# Patient Record
Sex: FEMALE | Race: WHITE | NOT HISPANIC OR LATINO | Employment: UNEMPLOYED | URBAN - METROPOLITAN AREA
[De-identification: names, ages, dates, MRNs, and addresses within clinical notes are randomized per-mention and may not be internally consistent; named-entity substitution may affect disease eponyms.]

---

## 2018-10-01 ENCOUNTER — TRANSCRIBE ORDERS (OUTPATIENT)
Dept: ADMINISTRATIVE | Facility: HOSPITAL | Age: 43
End: 2018-10-01

## 2018-10-01 DIAGNOSIS — Z12.31 VISIT FOR SCREENING MAMMOGRAM: Primary | ICD-10-CM

## 2021-10-24 ENCOUNTER — APPOINTMENT (OUTPATIENT)
Dept: CT IMAGING | Facility: HOSPITAL | Age: 46
End: 2021-10-24

## 2021-10-24 ENCOUNTER — HOSPITAL ENCOUNTER (EMERGENCY)
Facility: HOSPITAL | Age: 46
Discharge: HOME | End: 2021-10-24
Attending: EMERGENCY MEDICINE | Admitting: EMERGENCY MEDICINE

## 2021-10-24 VITALS
HEART RATE: 70 BPM | HEIGHT: 64 IN | WEIGHT: 110 LBS | BODY MASS INDEX: 18.78 KG/M2 | TEMPERATURE: 97.8 F | SYSTOLIC BLOOD PRESSURE: 142 MMHG | OXYGEN SATURATION: 94 % | RESPIRATION RATE: 18 BRPM | DIASTOLIC BLOOD PRESSURE: 99 MMHG

## 2021-10-24 DIAGNOSIS — U07.1 PNEUMONIA DUE TO COVID-19 VIRUS: Primary | ICD-10-CM

## 2021-10-24 DIAGNOSIS — J12.82 PNEUMONIA DUE TO COVID-19 VIRUS: Primary | ICD-10-CM

## 2021-10-24 DIAGNOSIS — R53.1 WEAKNESS: ICD-10-CM

## 2021-10-24 DIAGNOSIS — R07.81 PLEURITIC CHEST PAIN: ICD-10-CM

## 2021-10-24 LAB
ALBUMIN SERPL-MCNC: 3.4 G/DL (ref 3.5–5.2)
ALBUMIN/GLOB SERPL: 1.3 G/DL
ALP SERPL-CCNC: 109 U/L (ref 39–117)
ALT SERPL W P-5'-P-CCNC: 126 U/L (ref 1–33)
ANION GAP SERPL CALCULATED.3IONS-SCNC: 10.7 MMOL/L (ref 5–15)
AST SERPL-CCNC: 109 U/L (ref 1–32)
BACTERIA UR QL AUTO: ABNORMAL /HPF
BASOPHILS # BLD AUTO: 0.01 10*3/MM3 (ref 0–0.2)
BASOPHILS NFR BLD AUTO: 0.2 % (ref 0–1.5)
BILIRUB SERPL-MCNC: 0.6 MG/DL (ref 0–1.2)
BILIRUB UR QL STRIP: NEGATIVE
BUN SERPL-MCNC: 13 MG/DL (ref 6–20)
BUN/CREAT SERPL: 22.4 (ref 7–25)
CALCIUM SPEC-SCNC: 8.6 MG/DL (ref 8.6–10.5)
CHLORIDE SERPL-SCNC: 102 MMOL/L (ref 98–107)
CLARITY UR: CLEAR
CO2 SERPL-SCNC: 26.3 MMOL/L (ref 22–29)
COLOR UR: YELLOW
CREAT SERPL-MCNC: 0.58 MG/DL (ref 0.57–1)
D DIMER PPP FEU-MCNC: 0.78 MCGFEU/ML (ref 0–0.46)
DEPRECATED RDW RBC AUTO: 41.1 FL (ref 37–54)
EOSINOPHIL # BLD AUTO: 0.01 10*3/MM3 (ref 0–0.4)
EOSINOPHIL NFR BLD AUTO: 0.2 % (ref 0.3–6.2)
ERYTHROCYTE [DISTWIDTH] IN BLOOD BY AUTOMATED COUNT: 12.4 % (ref 12.3–15.4)
GFR SERPL CREATININE-BSD FRML MDRD: 111 ML/MIN/1.73
GLOBULIN UR ELPH-MCNC: 2.7 GM/DL
GLUCOSE SERPL-MCNC: 89 MG/DL (ref 65–99)
GLUCOSE UR STRIP-MCNC: NEGATIVE MG/DL
HCT VFR BLD AUTO: 44.3 % (ref 34–46.6)
HGB BLD-MCNC: 14.2 G/DL (ref 12–15.9)
HGB UR QL STRIP.AUTO: ABNORMAL
HYALINE CASTS UR QL AUTO: ABNORMAL /LPF
IMM GRANULOCYTES # BLD AUTO: 0.19 10*3/MM3 (ref 0–0.05)
IMM GRANULOCYTES NFR BLD AUTO: 3.2 % (ref 0–0.5)
KETONES UR QL STRIP: ABNORMAL
LEUKOCYTE ESTERASE UR QL STRIP.AUTO: ABNORMAL
LYMPHOCYTES # BLD AUTO: 1.19 10*3/MM3 (ref 0.7–3.1)
LYMPHOCYTES NFR BLD AUTO: 19.9 % (ref 19.6–45.3)
MCH RBC QN AUTO: 28.5 PG (ref 26.6–33)
MCHC RBC AUTO-ENTMCNC: 32.1 G/DL (ref 31.5–35.7)
MCV RBC AUTO: 89 FL (ref 79–97)
MONOCYTES # BLD AUTO: 0.42 10*3/MM3 (ref 0.1–0.9)
MONOCYTES NFR BLD AUTO: 7 % (ref 5–12)
NEUTROPHILS NFR BLD AUTO: 4.15 10*3/MM3 (ref 1.7–7)
NEUTROPHILS NFR BLD AUTO: 69.5 % (ref 42.7–76)
NITRITE UR QL STRIP: NEGATIVE
PH UR STRIP.AUTO: 8 [PH] (ref 4.5–8)
PLATELET # BLD AUTO: 313 10*3/MM3 (ref 140–450)
PMV BLD AUTO: 10 FL (ref 6–12)
POTASSIUM SERPL-SCNC: 3.6 MMOL/L (ref 3.5–5.2)
PROCALCITONIN SERPL-MCNC: 0.07 NG/ML (ref 0–0.25)
PROT SERPL-MCNC: 6.1 G/DL (ref 6–8.5)
PROT UR QL STRIP: NEGATIVE
QT INTERVAL: 416 MS
RBC # BLD AUTO: 4.98 10*6/MM3 (ref 3.77–5.28)
RBC # UR: ABNORMAL /HPF
REF LAB TEST METHOD: ABNORMAL
SODIUM SERPL-SCNC: 139 MMOL/L (ref 136–145)
SP GR UR STRIP: 1.01 (ref 1–1.03)
SQUAMOUS #/AREA URNS HPF: ABNORMAL /HPF
TROPONIN T SERPL-MCNC: <0.01 NG/ML (ref 0–0.03)
UROBILINOGEN UR QL STRIP: ABNORMAL
WBC # BLD AUTO: 5.97 10*3/MM3 (ref 3.4–10.8)
WBC UR QL AUTO: ABNORMAL /HPF

## 2021-10-24 PROCEDURE — 81001 URINALYSIS AUTO W/SCOPE: CPT | Performed by: EMERGENCY MEDICINE

## 2021-10-24 PROCEDURE — 93010 ELECTROCARDIOGRAM REPORT: CPT | Performed by: INTERNAL MEDICINE

## 2021-10-24 PROCEDURE — 93005 ELECTROCARDIOGRAM TRACING: CPT | Performed by: EMERGENCY MEDICINE

## 2021-10-24 PROCEDURE — 84145 PROCALCITONIN (PCT): CPT | Performed by: EMERGENCY MEDICINE

## 2021-10-24 PROCEDURE — 0 IOPAMIDOL PER 1 ML: Performed by: EMERGENCY MEDICINE

## 2021-10-24 PROCEDURE — 99283 EMERGENCY DEPT VISIT LOW MDM: CPT | Performed by: EMERGENCY MEDICINE

## 2021-10-24 PROCEDURE — 99284 EMERGENCY DEPT VISIT MOD MDM: CPT

## 2021-10-24 PROCEDURE — 85379 FIBRIN DEGRADATION QUANT: CPT | Performed by: EMERGENCY MEDICINE

## 2021-10-24 PROCEDURE — 71275 CT ANGIOGRAPHY CHEST: CPT

## 2021-10-24 PROCEDURE — 84484 ASSAY OF TROPONIN QUANT: CPT | Performed by: EMERGENCY MEDICINE

## 2021-10-24 PROCEDURE — 85025 COMPLETE CBC W/AUTO DIFF WBC: CPT | Performed by: EMERGENCY MEDICINE

## 2021-10-24 PROCEDURE — 80053 COMPREHEN METABOLIC PANEL: CPT | Performed by: EMERGENCY MEDICINE

## 2021-10-24 RX ORDER — BENZONATATE 100 MG/1
100 CAPSULE ORAL 2 TIMES DAILY PRN
Qty: 15 CAPSULE | Refills: 0 | Status: SHIPPED | OUTPATIENT
Start: 2021-10-24 | End: 2021-10-31

## 2021-10-24 RX ORDER — SODIUM CHLORIDE 0.9 % (FLUSH) 0.9 %
10 SYRINGE (ML) INJECTION AS NEEDED
Status: DISCONTINUED | OUTPATIENT
Start: 2021-10-24 | End: 2021-10-24 | Stop reason: HOSPADM

## 2021-10-24 RX ORDER — LEVOTHYROXINE SODIUM 137 UG/1
137 TABLET ORAL DAILY
COMMUNITY

## 2021-10-24 RX ORDER — ALBUTEROL SULFATE 90 UG/1
2 AEROSOL, METERED RESPIRATORY (INHALATION) EVERY 6 HOURS PRN
Qty: 8 G | Refills: 0 | Status: SHIPPED | OUTPATIENT
Start: 2021-10-24

## 2021-10-24 RX ORDER — ONDANSETRON 4 MG/1
4 TABLET, FILM COATED ORAL EVERY 8 HOURS PRN
Qty: 20 TABLET | Refills: 0 | Status: SHIPPED | OUTPATIENT
Start: 2021-10-24 | End: 2021-10-31

## 2021-10-24 RX ORDER — BENZONATATE 100 MG/1
100 CAPSULE ORAL ONCE
Status: COMPLETED | OUTPATIENT
Start: 2021-10-24 | End: 2021-10-24

## 2021-10-24 RX ADMIN — IOPAMIDOL 100 ML: 755 INJECTION, SOLUTION INTRAVENOUS at 14:07

## 2021-10-24 RX ADMIN — BENZONATATE 100 MG: 100 CAPSULE ORAL at 11:28

## 2021-10-24 RX ADMIN — SODIUM CHLORIDE, POTASSIUM CHLORIDE, SODIUM LACTATE AND CALCIUM CHLORIDE 1000 ML: 600; 310; 30; 20 INJECTION, SOLUTION INTRAVENOUS at 11:29

## 2021-10-24 NOTE — ED NOTES
Patient arrives to ER with worsening symptoms of COVID. She was diagnosed positive Oct. 16, 2021. She relates she feels like she cannot get a deep breath and has a cough that will not go away. Fine crackles noted t/o lung field.     Teresita Duque, RN  10/24/21 9719

## 2021-10-24 NOTE — ED PROVIDER NOTES
Subjective   History of Present Illness  History of Present Illness    Chief complaint: Generalized weakness, coughing, rib pain    Location: generalized symptoms, left sided rib pains    Quality/Severity: Moderate weakness and pains    Timing/Duration: Ongoing for the past 9 days    Modifying Factors: None    Narrative: This patient presents for evaluation of persistent weakness with coughing and rib pains on her left side.  She was diagnosed with COVID-19 8 days ago.  She has been having symptoms for 9 days.  She is no longer having fevers.  She does have persistent coughing that is nonproductive.  With her coughing she is having a lot of pain in the left-sided ribs.  She has had a poor appetite but no vomiting or diarrhea symptoms.  She has generalized weakness and fatigue symptoms.  She says that her  insisted she should come to the hospital today to be evaluated.  She has been checking her oxygen occasionally at home and says that it occasionally does drop into the 70s and 80s range but on arrival here it is persistently above 95%.    Associated Symptoms: As above    Review of Systems   Constitutional: Positive for activity change, appetite change and fatigue. Negative for fever.   HENT: Positive for congestion.    Eyes: Negative for pain and visual disturbance.   Respiratory: Positive for cough and shortness of breath.    Cardiovascular: Positive for chest pain.   Gastrointestinal: Positive for nausea. Negative for diarrhea.   Genitourinary: Negative for dysuria and frequency.   Musculoskeletal: Positive for myalgias.   Skin: Negative for color change.   Neurological: Positive for weakness. Negative for syncope and headaches.   Psychiatric/Behavioral: Negative for confusion.   All other systems reviewed and are negative.      Past Medical History:   Diagnosis Date   • Disease of thyroid gland    • Hyperlipidemia        Allergies   Allergen Reactions   • Latex Anaphylaxis   • Erythromycin Hives   •  Penicillins Hives       History reviewed. No pertinent surgical history.    History reviewed. No pertinent family history.    Social History     Socioeconomic History   • Marital status:    Tobacco Use   • Smoking status: Never Smoker   Substance and Sexual Activity   • Alcohol use: Never   • Drug use: Never   • Sexual activity: Defer     ED Triage Vitals   Temp Heart Rate Resp BP SpO2   10/24/21 1100 10/24/21 1110 10/24/21 1100 10/24/21 1100 10/24/21 1100   97.9 °F (36.6 °C) 79 18 157/87 96 %      Temp src Heart Rate Source Patient Position BP Location FiO2 (%)   10/24/21 1110 10/24/21 1100 10/24/21 1100 10/24/21 1100 --   Oral Monitor Sitting Left arm      Objective   Physical Exam  Vitals and nursing note reviewed.   Constitutional:       General: She is not in acute distress.     Appearance: She is well-developed. She is not toxic-appearing or diaphoretic.   HENT:      Head: Normocephalic and atraumatic.      Nose: Nose normal.      Mouth/Throat:      Mouth: Mucous membranes are moist.   Eyes:      General:         Right eye: No discharge.         Left eye: No discharge.      Pupils: Pupils are equal, round, and reactive to light.   Cardiovascular:      Rate and Rhythm: Normal rate and regular rhythm.      Pulses: Normal pulses.      Heart sounds: Normal heart sounds. No murmur heard.  No friction rub.   Pulmonary:      Effort: Pulmonary effort is normal. No respiratory distress.      Breath sounds: No stridor. Rhonchi present.      Comments: Few scattered rhonchi noted bilaterally.  Normal work of breathing is noted.  Mild tenderness to the left lateral chest wall apparent.  No bruising or crepitus palpable.  Chest:      Chest wall: Tenderness present.   Abdominal:      Palpations: Abdomen is soft.      Tenderness: There is no abdominal tenderness. There is no guarding or rebound.   Musculoskeletal:         General: No deformity. Normal range of motion.      Cervical back: Normal range of motion and neck  supple.   Skin:     General: Skin is warm and dry.      Findings: No erythema or rash.   Neurological:      Mental Status: She is alert and oriented to person, place, and time.   Psychiatric:         Behavior: Behavior normal.         Thought Content: Thought content normal.         Judgment: Judgment normal.       EKG           EKG time/Interp time: 1117/1120  Rhythm/Rate: Sinus rhythm, 67 bpm  P waves and DE: P waves are present, 108 ms  QRS, axis: 94 ms, normal axis  ST and T waves: Nonspecific T wave flattening in the anterior lateral leads    Independently interpreted by me contemporaneously with treatment    Results for orders placed or performed during the hospital encounter of 10/24/21   Comprehensive Metabolic Panel    Specimen: Blood   Result Value Ref Range    Glucose 89 65 - 99 mg/dL    BUN 13 6 - 20 mg/dL    Creatinine 0.58 0.57 - 1.00 mg/dL    Sodium 139 136 - 145 mmol/L    Potassium 3.6 3.5 - 5.2 mmol/L    Chloride 102 98 - 107 mmol/L    CO2 26.3 22.0 - 29.0 mmol/L    Calcium 8.6 8.6 - 10.5 mg/dL    Total Protein 6.1 6.0 - 8.5 g/dL    Albumin 3.40 (L) 3.50 - 5.20 g/dL    ALT (SGPT) 126 (H) 1 - 33 U/L    AST (SGOT) 109 (H) 1 - 32 U/L    Alkaline Phosphatase 109 39 - 117 U/L    Total Bilirubin 0.6 0.0 - 1.2 mg/dL    eGFR Non African Amer 111 >60 mL/min/1.73    Globulin 2.7 gm/dL    A/G Ratio 1.3 g/dL    BUN/Creatinine Ratio 22.4 7.0 - 25.0    Anion Gap 10.7 5.0 - 15.0 mmol/L   Troponin    Specimen: Blood   Result Value Ref Range    Troponin T <0.010 0.000 - 0.030 ng/mL   D-dimer, Quantitative    Specimen: Blood   Result Value Ref Range    D-Dimer, Quantitative 0.78 (H) 0.00 - 0.46 MCGFEU/mL   Procalcitonin    Specimen: Blood   Result Value Ref Range    Procalcitonin 0.07 0.00 - 0.25 ng/mL   Urinalysis With Microscopic If Indicated (No Culture) - Urine, Clean Catch    Specimen: Urine, Clean Catch   Result Value Ref Range    Color, UA Yellow Yellow, Straw    Appearance, UA Clear Clear    pH, UA 8.0 4.5 -  8.0    Specific Gravity, UA 1.015 1.003 - 1.030    Glucose, UA Negative Negative    Ketones, UA 15 mg/dL (1+) (A) Negative    Bilirubin, UA Negative Negative    Blood, UA Trace (A) Negative    Protein, UA Negative Negative    Leuk Esterase, UA Trace (A) Negative    Nitrite, UA Negative Negative    Urobilinogen, UA 4.0 E.U./dL (A) 0.2 - 1.0 E.U./dL   CBC Auto Differential    Specimen: Blood   Result Value Ref Range    WBC 5.97 3.40 - 10.80 10*3/mm3    RBC 4.98 3.77 - 5.28 10*6/mm3    Hemoglobin 14.2 12.0 - 15.9 g/dL    Hematocrit 44.3 34.0 - 46.6 %    MCV 89.0 79.0 - 97.0 fL    MCH 28.5 26.6 - 33.0 pg    MCHC 32.1 31.5 - 35.7 g/dL    RDW 12.4 12.3 - 15.4 %    RDW-SD 41.1 37.0 - 54.0 fl    MPV 10.0 6.0 - 12.0 fL    Platelets 313 140 - 450 10*3/mm3    Neutrophil % 69.5 42.7 - 76.0 %    Lymphocyte % 19.9 19.6 - 45.3 %    Monocyte % 7.0 5.0 - 12.0 %    Eosinophil % 0.2 (L) 0.3 - 6.2 %    Basophil % 0.2 0.0 - 1.5 %    Immature Grans % 3.2 (H) 0.0 - 0.5 %    Neutrophils, Absolute 4.15 1.70 - 7.00 10*3/mm3    Lymphocytes, Absolute 1.19 0.70 - 3.10 10*3/mm3    Monocytes, Absolute 0.42 0.10 - 0.90 10*3/mm3    Eosinophils, Absolute 0.01 0.00 - 0.40 10*3/mm3    Basophils, Absolute 0.01 0.00 - 0.20 10*3/mm3    Immature Grans, Absolute 0.19 (H) 0.00 - 0.05 10*3/mm3   Urinalysis, Microscopic Only - Urine, Clean Catch    Specimen: Urine, Clean Catch   Result Value Ref Range    RBC, UA 0-2 (A) None Seen /HPF    WBC, UA 3-5 (A) None Seen /HPF    Bacteria, UA Trace (A) None Seen /HPF    Squamous Epithelial Cells, UA 7-12 (A) None Seen, 0-2 /HPF    Hyaline Casts, UA None Seen None Seen /LPF    Methodology Manual Light Microscopy    ECG 12 Lead   Result Value Ref Range    QT Interval 416 ms     RADIOLOGY        Study: CTA chest    Findings: 1. No evidence for pulmonary embolism.  2. Fairly extensive bilateral infiltrates consistent with Covid pneumonia asymmetrically worse involving the right lung.     Interpreted contemporaneously with  "treatment by Dr. Milton, independently viewed by me    Procedures           ED Course  ED Course as of 10/24/21 1612   Sun Oct 24, 2021   1610 I have reviewed the EKG and labs and CTA from today's visit.  All findings look to be acceptable to me.  There is no PE.  Patient does have bilateral Covid pneumonia but her work of breathing and her oxygenation are totally normal on room air here.  She is not vomiting.  Her electrolytes look to be balanced.  She is feeling better after some IV fluids and cough medicine.  I think she can continue symptomatic management at home for this viral illness.  I will prescribe her an albuterol inhaler as well as cough medicine and nausea medicine.  Fortunately, I think she is probably past the worst part of her illness now.  However I did review with her all of the usual \"return to ER\" instructions prior to discharge. [SHANICE]      ED Course User Index  [SHANICE] Nico Martínez MD                                           MDM  Number of Diagnoses or Management Options     Amount and/or Complexity of Data Reviewed  Clinical lab tests: reviewed and ordered  Tests in the radiology section of CPT®: reviewed and ordered  Tests in the medicine section of CPT®: reviewed  Decide to obtain previous medical records or to obtain history from someone other than the patient: yes  Review and summarize past medical records: yes  Independent visualization of images, tracings, or specimens: yes    Risk of Complications, Morbidity, and/or Mortality  Presenting problems: moderate  Diagnostic procedures: moderate  Management options: moderate        Final diagnoses:   Pneumonia due to COVID-19 virus   Weakness   Pleuritic chest pain       ED Disposition  ED Disposition     ED Disposition Condition Comment    Discharge Stable           PCP Office    Schedule an appointment as soon as possible for a visit in 1 week  Follow-up with local PCP for repeat evaluation         Medication List      New Prescriptions  "   albuterol sulfate  (90 Base) MCG/ACT inhaler  Commonly known as: PROVENTIL HFA;VENTOLIN HFA;PROAIR HFA  Inhale 2 puffs Every 6 (Six) Hours As Needed for Wheezing or Shortness of Air.     benzonatate 100 MG capsule  Commonly known as: TESSALON  Take 1 capsule by mouth 2 (Two) Times a Day As Needed for Cough for up to 7 days.     ondansetron 4 MG tablet  Commonly known as: ZOFRAN  Take 1 tablet by mouth Every 8 (Eight) Hours As Needed for Nausea or Vomiting for up to 7 days.           Where to Get Your Medications      You can get these medications from any pharmacy    Bring a paper prescription for each of these medications  · albuterol sulfate  (90 Base) MCG/ACT inhaler  · benzonatate 100 MG capsule  · ondansetron 4 MG tablet          Nico Martínez MD  10/24/21 [id number]

## 2021-10-24 NOTE — ED NOTES
Appropriate PPE worn when entering room: gown, gloves, N95, eye protection.     Teresita Duque RN  10/24/21 1204       Teresita Duque RN  10/24/21 1212

## 2023-04-10 ENCOUNTER — OFFICE VISIT (OUTPATIENT)
Dept: OBSTETRICS AND GYNECOLOGY | Facility: CLINIC | Age: 48
End: 2023-04-10
Payer: COMMERCIAL

## 2023-04-10 VITALS
WEIGHT: 131.4 LBS | SYSTOLIC BLOOD PRESSURE: 130 MMHG | DIASTOLIC BLOOD PRESSURE: 88 MMHG | HEIGHT: 64 IN | BODY MASS INDEX: 22.43 KG/M2

## 2023-04-10 DIAGNOSIS — Z87.898 HISTORY OF PITUITARY TUMOR: ICD-10-CM

## 2023-04-10 DIAGNOSIS — Z12.31 ENCOUNTER FOR SCREENING MAMMOGRAM FOR MALIGNANT NEOPLASM OF BREAST: ICD-10-CM

## 2023-04-10 DIAGNOSIS — Z01.419 ROUTINE GYNECOLOGICAL EXAMINATION: Primary | ICD-10-CM

## 2023-04-10 DIAGNOSIS — Z30.011 VISIT FOR ORAL CONTRACEPTIVE PRESCRIPTION: ICD-10-CM

## 2023-04-10 DIAGNOSIS — Z80.41 FAMILY HISTORY OF OVARIAN CANCER: ICD-10-CM

## 2023-04-10 DIAGNOSIS — R31.9 HEMATURIA, UNSPECIFIED TYPE: ICD-10-CM

## 2023-04-10 LAB
BILIRUB BLD-MCNC: NEGATIVE MG/DL
CLARITY, POC: CLEAR
COLOR UR: YELLOW
GLUCOSE UR STRIP-MCNC: NEGATIVE MG/DL
KETONES UR QL: NEGATIVE
LEUKOCYTE EST, POC: NEGATIVE
NITRITE UR-MCNC: NEGATIVE MG/ML
PH UR: 5 [PH] (ref 5–8)
PROT UR STRIP-MCNC: NEGATIVE MG/DL
RBC # UR STRIP: ABNORMAL /UL
SP GR UR: 1 (ref 1–1.03)
UROBILINOGEN UR QL: NORMAL

## 2023-04-10 RX ORDER — MELATONIN
1000 DAILY
COMMUNITY

## 2023-04-10 RX ORDER — NORETHINDRONE ACETATE AND ETHINYL ESTRADIOL, ETHINYL ESTRADIOL AND FERROUS FUMARATE 1MG-10(24)
KIT ORAL
COMMUNITY
Start: 2023-01-14 | End: 2023-04-10 | Stop reason: SDUPTHER

## 2023-04-10 RX ORDER — NORETHINDRONE ACETATE AND ETHINYL ESTRADIOL, ETHINYL ESTRADIOL AND FERROUS FUMARATE 1MG-10(24)
1 KIT ORAL DAILY
Qty: 84 TABLET | Refills: 3 | Status: SHIPPED | OUTPATIENT
Start: 2023-04-10

## 2023-04-10 RX ORDER — DIPHENOXYLATE HYDROCHLORIDE AND ATROPINE SULFATE 2.5; .025 MG/1; MG/1
TABLET ORAL
COMMUNITY

## 2023-04-10 RX ORDER — LEVOTHYROXINE SODIUM 75 MCG
TABLET ORAL
COMMUNITY
Start: 2023-02-10

## 2023-04-10 RX ORDER — DEXTROAMPHETAMINE SACCHARATE, AMPHETAMINE ASPARTATE, DEXTROAMPHETAMINE SULFATE AND AMPHETAMINE SULFATE 7.5; 7.5; 7.5; 7.5 MG/1; MG/1; MG/1; MG/1
TABLET ORAL
COMMUNITY
Start: 2023-03-31

## 2023-04-10 RX ORDER — MELOXICAM 15 MG/1
TABLET ORAL
COMMUNITY
Start: 2023-02-22

## 2023-04-10 RX ORDER — ATORVASTATIN CALCIUM 40 MG/1
TABLET, FILM COATED ORAL
COMMUNITY
Start: 2023-03-12

## 2023-04-10 RX ORDER — CALCIUM CARBONATE 200(500)MG
1 TABLET,CHEWABLE ORAL DAILY
COMMUNITY

## 2023-04-10 RX ORDER — LEVOTHYROXINE SODIUM 88 UG/1
TABLET ORAL
COMMUNITY
Start: 2010-01-01

## 2023-04-10 NOTE — PROGRESS NOTES
GYN Annual Exam     CC- Here for annual exam.     Kendra Mendoza is a 47 y.o. female new patient who presents for annual well woman exam. Periods are rare, lasting 1 days.  She is on LoLestrin continously. She has a family history of ovarian cancer and is not interested in BRCA screening today but will consider. She is not interested in Cologuard or C scope. She has a h/o a pituitary tumor but has not had her levels checked in > 15 years.  She is a former pt of Dr Tyson.     OB History        0    Para   0    Term   0       0    AB   0    Living   0       SAB   0    IAB   0    Ectopic   0    Molar   0    Multiple   0    Live Births   0                Menarche: 10  Current contraception: OCP (estrogen/progesterone)  History of abnormal Pap smear: no  History of abnormal mammogram: no  Family history of uterine, colon or ovarian cancer: yes - 2nd cousin- colon cancer, MGM ovarian cancer  Family history of breast cancer: no  H/o STDs: none  Last pap: - nl pap   Gardasil:missed  BIRGIT: none   H/O pituitary tumor.     Health Maintenance   Topic Date Due   • Annual Gynecologic Pelvic and Breast Exam  Never done   • COLORECTAL CANCER SCREENING  Never done   • COVID-19 Vaccine (1) Never done   • HEPATITIS C SCREENING  Never done   • ANNUAL PHYSICAL  Never done   • LIPID PANEL  Never done   • INFLUENZA VACCINE  2023   • PAP SMEAR  04/10/2026   • TDAP/TD VACCINES (2 - Td or Tdap) 2028   • Pneumococcal Vaccine 0-64  Aged Out       Past Medical History:   Diagnosis Date   • Disease of thyroid gland    • History of pituitary tumor    • Hyperlipidemia        History reviewed. No pertinent surgical history.      Current Outpatient Medications:   •  atorvastatin (LIPITOR) 40 MG tablet, , Disp: , Rfl:   •  Cholecalciferol 25 MCG (1000 UT) tablet, Take 1 tablet by mouth Daily., Disp: , Rfl:   •  levothyroxine (SYNTHROID, LEVOTHROID) 88 MCG tablet, , Disp: , Rfl:   •  Lo Loestrin Fe 1 MG-10 MCG / 10 MCG  tablet, Take 1 tablet by mouth Daily., Disp: 84 tablet, Rfl: 3  •  meloxicam (MOBIC) 15 MG tablet, , Disp: , Rfl:   •  multivitamin (THERAGRAN) tablet tablet, Take  by mouth., Disp: , Rfl:   •  Synthroid 75 MCG tablet, , Disp: , Rfl:   •  amphetamine-dextroamphetamine (ADDERALL) 30 MG tablet, , Disp: , Rfl:   •  calcium carbonate (TUMS) 500 MG chewable tablet, Chew 1 tablet Daily., Disp: , Rfl:   •  fluocinonide (LIDEX) 0.05 % cream, APPLY TO AFFECTED AREA TWICE A DAY UNTIL DIRECTED TO STOP, Disp: , Rfl:   •  NON FORMULARY, 1 tablet Daily., Disp: , Rfl:     Allergies   Allergen Reactions   • Latex Anaphylaxis and Swelling     Lips swelled at the dentist office   • Erythromycin Hives and Rash   • Penicillins Hives and Rash       Social History     Tobacco Use   • Smoking status: Never   Vaping Use   • Vaping Use: Never used   Substance Use Topics   • Alcohol use: Never   • Drug use: Never       Family History   Problem Relation Age of Onset   • Ovarian cancer Paternal Grandmother    • Colon cancer Other    • Breast cancer Neg Hx    • Uterine cancer Neg Hx    • Deep vein thrombosis Neg Hx    • Pulmonary embolism Neg Hx        Review of Systems   Constitutional: Negative for activity change, appetite change, fatigue, fever and unexpected weight change.   Eyes: Negative for photophobia and visual disturbance.   Respiratory: Negative for cough and shortness of breath.    Cardiovascular: Negative for chest pain and palpitations.   Gastrointestinal: Negative for abdominal distention, abdominal pain, constipation, diarrhea and nausea.   Endocrine: Negative for cold intolerance and heat intolerance.   Genitourinary: Negative for dyspareunia, dysuria, menstrual problem, pelvic pain, vaginal bleeding and vaginal discharge.   Musculoskeletal: Negative for back pain.   Skin: Negative for color change and rash.   Neurological: Negative for headaches.   Hematological: Negative for adenopathy. Does not bruise/bleed easily.  "  Psychiatric/Behavioral: Negative for dysphoric mood. The patient is not nervous/anxious.        /88   Ht 162.6 cm (64.02\")   Wt 59.6 kg (131 lb 6.4 oz)   LMP  (LMP Unknown)   BMI 22.54 kg/m²     Physical Exam  Vitals and nursing note reviewed. Exam conducted with a chaperone present.   Constitutional:       Appearance: Normal appearance. She is well-developed and normal weight.   HENT:      Head: Normocephalic and atraumatic.   Eyes:      General: No scleral icterus.     Conjunctiva/sclera: Conjunctivae normal.   Neck:      Thyroid: No thyromegaly.   Cardiovascular:      Rate and Rhythm: Normal rate and regular rhythm.   Pulmonary:      Effort: Pulmonary effort is normal.      Breath sounds: Normal breath sounds.   Chest:   Breasts:     Right: No swelling, bleeding, inverted nipple, mass, nipple discharge, skin change or tenderness.      Left: No swelling, bleeding, inverted nipple, mass, nipple discharge, skin change or tenderness.   Abdominal:      General: Bowel sounds are normal. There is no distension.      Palpations: Abdomen is soft. There is no mass.      Tenderness: There is no abdominal tenderness. There is no guarding or rebound.      Hernia: No hernia is present.   Genitourinary:     Exam position: Supine.      Labia:         Right: No rash, tenderness or lesion.         Left: No rash, tenderness or lesion.       Urethra: No prolapse, urethral pain, urethral swelling or urethral lesion.      Vagina: No signs of injury and foreign body. No vaginal discharge, erythema, tenderness or bleeding.      Cervix: No cervical motion tenderness, discharge or friability.      Uterus: Not deviated, not enlarged, not fixed and not tender.       Adnexa:         Right: No mass, tenderness or fullness.          Left: No mass, tenderness or fullness.     Musculoskeletal:      Cervical back: Neck supple.   Skin:     General: Skin is warm and dry.   Neurological:      Mental Status: She is alert and oriented to " person, place, and time.   Psychiatric:         Mood and Affect: Mood normal.         Behavior: Behavior normal.         Thought Content: Thought content normal.         Judgment: Judgment normal.            Assessment/Plan    1) GYN HM: pap/HPV  SBE demonstrated and encouraged.  2) STD screening: declines Condoms encouraged.  3) Contraception: OCP (estrogen/progesterone) Discussed with patient correct usage of oral contraceptive pills/patches/rings and what to do for a missed dose.  Patient reminded that condoms are the only form of contraceptive that can also prevent STDs and so use is encouraged with every act of coitus.  We reviewed ACHES warning signs (abdominal pain, chest pain, headache, eye vision changes or severe leg pain and or swelling).  Patient is encouraged to call for any questions or concerns.  Can continue OCPS until age 52.   4) Family Planning: family planning: never wants children, encourage folic acid daily  5) Diet and Exercise discussed  6) Smoking Status: No  7) Family history of ovarian cancer- pt declines BRCA testing, [facility] info given  8) MMG- UTD 6/2022 B1. [facility] 6/2023.   9)Hematuria- check UA and CS  10) H/O pituitary tumor- check prolactin  11) Declines Cscope and Cologuard.   12) I saw the patient with a face mask, gloves and eye protection  The patient herself was masked.  Social distancing was observed as appropriate.  13)Follow up prn or 1 year annual   14)  I spent > 30 minutes on the separately reported service of hematuria, h/o pituitary tumor, family history of ovarian cancer. This time is not included in the time used to support the annual E/M service also reported today.         Diagnoses and all orders for this visit:    1. Routine gynecological examination (Primary)  -     POC Urinalysis Dipstick  -     Prolactin  -     IGP, Apt HPV,rfx 16 / 18,45    2. Hematuria, unspecified type  -     Urine Culture - Urine, Urine, Random Void  -     Urinalysis With Microscopic  - Urine, Random Void    3. Encounter for screening mammogram for malignant neoplasm of breast  -     Mammo Screening Digital Tomosynthesis Bilateral With CAD; Future  -     IGP, Apt HPV,rfx 16 / 18,45    4. Visit for oral contraceptive prescription    5. Family history of ovarian cancer    6. History of pituitary tumor    Other orders  -     Lo Loestrin Fe 1 MG-10 MCG / 10 MCG tablet; Take 1 tablet by mouth Daily.  Dispense: 84 tablet; Refill: 3  -     Microscopic Examination -          Kimberly Harrington MD  04/10/2023    15:07 EDT

## 2023-04-11 ENCOUNTER — PATIENT ROUNDING (BHMG ONLY) (OUTPATIENT)
Dept: OBSTETRICS AND GYNECOLOGY | Facility: CLINIC | Age: 48
End: 2023-04-11
Payer: COMMERCIAL

## 2023-04-11 LAB — PROLACTIN SERPL-MCNC: 14.5 NG/ML (ref 4.8–23.3)

## 2023-04-11 NOTE — PROGRESS NOTES
A MY-CHART MESSAGE HAS BEEN SENT TO THE PATIENT FOR PATIENT ROUNDING WITH [facility]

## 2023-04-12 LAB
APPEARANCE UR: ABNORMAL
BACTERIA #/AREA URNS HPF: ABNORMAL /HPF
BACTERIA UR CULT: NORMAL
BACTERIA UR CULT: NORMAL
BILIRUB UR QL STRIP: NEGATIVE
CASTS URNS MICRO: ABNORMAL
COLOR UR: ABNORMAL
CRYSTALS URNS MICRO: ABNORMAL
EPI CELLS #/AREA URNS HPF: ABNORMAL /HPF
GLUCOSE UR QL STRIP: NEGATIVE
HGB UR QL STRIP: NEGATIVE
KETONES UR QL STRIP: ABNORMAL
LEUKOCYTE ESTERASE UR QL STRIP: ABNORMAL
NITRITE UR QL STRIP: NEGATIVE
PH UR STRIP: 6.5 [PH] (ref 5–8)
PROT UR QL STRIP: ABNORMAL
RBC #/AREA URNS HPF: ABNORMAL /HPF
SP GR UR STRIP: 1.03 (ref 1–1.03)
UROBILINOGEN UR STRIP-MCNC: ABNORMAL MG/DL
WBC #/AREA URNS HPF: ABNORMAL /HPF

## 2023-04-14 LAB
CYTOLOGIST CVX/VAG CYTO: ABNORMAL
CYTOLOGY CVX/VAG DOC CYTO: ABNORMAL
CYTOLOGY CVX/VAG DOC THIN PREP: ABNORMAL
DX ICD CODE: ABNORMAL
DX ICD CODE: ABNORMAL
HIV 1 & 2 AB SER-IMP: ABNORMAL
HPV I/H RISK 4 DNA CVX QL PROBE+SIG AMP: NEGATIVE
OTHER STN SPEC: ABNORMAL
PATHOLOGIST CVX/VAG CYTO: ABNORMAL
RECOM F/U CVX/VAG CYTO: ABNORMAL
STAT OF ADQ CVX/VAG CYTO-IMP: ABNORMAL

## 2023-04-16 RX ORDER — FLUCONAZOLE 150 MG/1
150 TABLET ORAL ONCE
Qty: 1 TABLET | Refills: 1 | Status: SHIPPED | OUTPATIENT
Start: 2023-04-16 | End: 2023-04-16

## 2023-04-17 NOTE — PROGRESS NOTES
PIP= pt has atypical cells on pap but neg HPV. Repeat pap/HPV in 1 year. She also has yeast on her pap smear and an ERX for Diflucan was called in.

## 2023-04-18 PROBLEM — Z30.011 VISIT FOR ORAL CONTRACEPTIVE PRESCRIPTION: Status: ACTIVE | Noted: 2023-04-18

## 2023-04-18 PROBLEM — R31.9 HEMATURIA: Status: ACTIVE | Noted: 2023-04-18

## 2023-04-18 PROBLEM — Z87.898 HISTORY OF PITUITARY TUMOR: Status: ACTIVE | Noted: 2023-04-18

## 2023-04-18 PROBLEM — Z80.41 FAMILY HISTORY OF OVARIAN CANCER: Status: ACTIVE | Noted: 2023-04-18

## 2023-05-01 ENCOUNTER — TELEPHONE (OUTPATIENT)
Dept: OBSTETRICS AND GYNECOLOGY | Facility: CLINIC | Age: 48
End: 2023-05-01
Payer: COMMERCIAL

## 2023-05-01 NOTE — TELEPHONE ENCOUNTER
Patient calling would like to know if she can get a refill on diflucan. The yeast infection has not gone away?

## 2023-05-02 RX ORDER — FLUCONAZOLE 150 MG/1
150 TABLET ORAL ONCE
Qty: 1 TABLET | Refills: 0 | Status: SHIPPED | OUTPATIENT
Start: 2023-05-02 | End: 2023-05-02

## 2023-05-08 ENCOUNTER — TELEPHONE (OUTPATIENT)
Dept: OBSTETRICS AND GYNECOLOGY | Facility: CLINIC | Age: 48
End: 2023-05-08
Payer: COMMERCIAL

## 2023-05-10 ENCOUNTER — TELEPHONE (OUTPATIENT)
Dept: OBSTETRICS AND GYNECOLOGY | Facility: CLINIC | Age: 48
End: 2023-05-10

## 2023-05-10 NOTE — TELEPHONE ENCOUNTER
PT REQUESTS TO SPEAK TO A NURSE REGARDING HER PRESCRIPTION THAT WAS SENT TO [facility]. PLEASE ADVISE PT.

## 2023-12-11 ENCOUNTER — HOSPITAL ENCOUNTER (EMERGENCY)
Facility: HOSPITAL | Age: 48
Discharge: HOME | End: 2023-12-11
Attending: EMERGENCY MEDICINE | Admitting: EMERGENCY MEDICINE
Payer: COMMERCIAL

## 2023-12-11 VITALS
WEIGHT: 130 LBS | RESPIRATION RATE: 18 BRPM | HEART RATE: 81 BPM | TEMPERATURE: 98.3 F | OXYGEN SATURATION: 100 % | DIASTOLIC BLOOD PRESSURE: 106 MMHG | SYSTOLIC BLOOD PRESSURE: 176 MMHG | BODY MASS INDEX: 20.89 KG/M2 | HEIGHT: 66 IN

## 2023-12-11 DIAGNOSIS — M79.674 TOE PAIN, RIGHT: Primary | ICD-10-CM

## 2023-12-11 PROCEDURE — 99282 EMERGENCY DEPT VISIT SF MDM: CPT

## 2023-12-11 NOTE — DISCHARGE INSTRUCTIONS
Continue your medications as directed.  Go directly to your podiatrist for your follow-up appointment this afternoon at 2:30.  Follow-up with your PCP as well.  Return to the ED for worsening symptoms or medical emergencies.

## 2023-12-11 NOTE — ED PROVIDER NOTES
EMERGENCY DEPARTMENT ENCOUNTER      Room Number: 14/14    History is provided by the patient, no translation services needed    HPI:    Chief complaint: Toe pain    Location: Right fifth toe    Quality/Severity: The patient describes the pain as a moderate tenderness    Timing/Duration: 1 week    Modifying Factors: None    Associated Symptoms: None    Narrative: Pt is a 48 y.o. female who presents complaining of right fifth toe pain x 1 week.  She describes the pain as a moderate tenderness.  She states that she also notices discoloration in the toe at times.  She states that this morning when she woke up the toe was quite red and the distal portion of the toe appeared purple.  She states that the discoloration has improved throughout the day.  She has scheduled an appointment with the podiatrist.  She states that she did a video call with a provider today and the provider instructed her to go to the ED.  She also complains of discoloration in her fingers when her hands are quite cold.  She denies that issue at this time.  She denies any other complaints.      PMD: Kendrick Perera MD    REVIEW OF SYSTEMS  Review of Systems   Constitutional:  Negative for fever.   Eyes:  Negative for visual disturbance.   Respiratory:  Negative for cough and shortness of breath.    Cardiovascular:  Negative for chest pain.   Gastrointestinal:  Negative for abdominal pain, nausea and vomiting.   Musculoskeletal:  Negative for back pain, gait problem and neck pain.   Skin:  Positive for color change (Right fifth toe). Negative for pallor, rash and wound.   Neurological:  Negative for dizziness, syncope, weakness and headaches.   Psychiatric/Behavioral:  Negative for confusion. The patient is not nervous/anxious.          PAST MEDICAL HISTORY  Active Ambulatory Problems     Diagnosis Date Noted    History of pituitary tumor 04/18/2023    Family history of ovarian cancer 04/18/2023    Visit for oral contraceptive prescription  04/18/2023    Hematuria 04/18/2023     Resolved Ambulatory Problems     Diagnosis Date Noted    No Resolved Ambulatory Problems     Past Medical History:   Diagnosis Date    Disease of thyroid gland     Hyperlipidemia        PAST SURGICAL HISTORY  Past Surgical History:   Procedure Laterality Date    HEMORRHOIDECTOMY         FAMILY HISTORY  Family History   Problem Relation Age of Onset    Ovarian cancer Paternal Grandmother     Colon cancer Other     Breast cancer Neg Hx     Uterine cancer Neg Hx     Deep vein thrombosis Neg Hx     Pulmonary embolism Neg Hx        SOCIAL HISTORY  Social History     Socioeconomic History    Marital status:    Tobacco Use    Smoking status: Never   Vaping Use    Vaping Use: Never used   Substance and Sexual Activity    Alcohol use: Never    Drug use: Never    Sexual activity: Yes     Partners: Male     Birth control/protection: Birth control pill       ALLERGIES  Latex, Erythromycin, and Penicillins    No current facility-administered medications for this encounter.    Current Outpatient Medications:     amphetamine-dextroamphetamine (ADDERALL) 30 MG tablet, , Disp: , Rfl:     atorvastatin (LIPITOR) 40 MG tablet, , Disp: , Rfl:     calcium carbonate (TUMS) 500 MG chewable tablet, Chew 1 tablet Daily., Disp: , Rfl:     Cholecalciferol 25 MCG (1000 UT) tablet, Take 1 tablet by mouth Daily., Disp: , Rfl:     fluocinonide (LIDEX) 0.05 % cream, APPLY TO AFFECTED AREA TWICE A DAY UNTIL DIRECTED TO STOP, Disp: , Rfl:     levothyroxine (SYNTHROID, LEVOTHROID) 88 MCG tablet, , Disp: , Rfl:     Lo Loestrin Fe 1 MG-10 MCG / 10 MCG tablet, Take 1 tablet by mouth Daily., Disp: 84 tablet, Rfl: 3    meloxicam (MOBIC) 15 MG tablet, , Disp: , Rfl:     multivitamin (THERAGRAN) tablet tablet, Take  by mouth., Disp: , Rfl:     NON FORMULARY, 1 tablet Daily., Disp: , Rfl:     Synthroid 75 MCG tablet, , Disp: , Rfl:     PHYSICAL EXAM  ED Triage Vitals [12/11/23 1305]   Temp Heart Rate Resp BP SpO2    98.3 °F (36.8 °C) 81 18 (!) 176/106 100 %      Temp src Heart Rate Source Patient Position BP Location FiO2 (%)   Oral Monitor Sitting Left arm --       Physical Exam  Vitals and nursing note reviewed.   Constitutional:       General: She is not in acute distress.     Appearance: Normal appearance. She is not ill-appearing, toxic-appearing or diaphoretic.   HENT:      Head: Normocephalic and atraumatic.      Nose: Nose normal. No congestion or rhinorrhea.      Mouth/Throat:      Mouth: Mucous membranes are moist.      Pharynx: Oropharynx is clear.   Eyes:      General: No scleral icterus.        Right eye: No discharge.         Left eye: No discharge.      Extraocular Movements: Extraocular movements intact.      Conjunctiva/sclera: Conjunctivae normal.      Pupils: Pupils are equal, round, and reactive to light.   Cardiovascular:      Rate and Rhythm: Normal rate and regular rhythm.      Pulses: Normal pulses.   Pulmonary:      Effort: Pulmonary effort is normal. No respiratory distress.   Abdominal:      General: There is no distension.      Palpations: Abdomen is soft.   Musculoskeletal:         General: Tenderness (Right fifth toe) present. No swelling, deformity or signs of injury. Normal range of motion.      Cervical back: Normal range of motion and neck supple. No rigidity.      Right lower leg: No edema.      Left lower leg: No edema.   Skin:     General: Skin is warm and dry.      Coloration: Skin is not jaundiced or pale.      Findings: Bruising (Right fifth toe) present. No erythema, lesion or rash.   Neurological:      Mental Status: She is alert and oriented to person, place, and time.      Motor: No weakness.      Coordination: Coordination normal.   Psychiatric:         Mood and Affect: Mood and affect normal.           LAB RESULTS  Lab Results (last 24 hours)       ** No results found for the last 24 hours. **              RADIOLOGY  No Radiology Exams Resulted Within Past 24  Hours        PROCEDURES  Procedures      PROGRESS AND CONSULTS  ED Course as of 12/11/23 1328   Mon Dec 11, 2023   1321 The patient appears well.  She is in no acute distress.  Her blood pressure is elevated.  All other vital signs are stable and within normal limits.  She is neurovascularly intact.  Pulses are 2+.  Capillary refill is within normal limits.  She advises that while she was waiting in the waiting room her podiatrist called and stated that they could see her this afternoon at 2:30 PM.  I advised her that I would like to order an x-ray to evaluate further.  She does not feel that an x-ray is necessary at this time and would prefer to do without.  Shortly after I exited the room, the patient contacted her podiatrist back and did confirm her appointment for 2:30 PM today.  She is requesting to be discharged to soon as possible in order to make the appointment with her podiatrist. [AH]      ED Course User Index  [AH] Yuridia Munoz PA-C           MEDICAL DECISION MAKING    MDM       My diagnosis for lower extremity pain and injury includes but is not limited to hip fracture, femur fracture, hip dislocation, hip contusion, hip sprain, hip strain, pelvic fracture, ischio-tibial band pain, ischio-tibial band bursitis, knee sprain, patella dislocation, knee dislocation, internal derangement of knee, fractures of the femur, tibia, fibula, ankle, foot and digits, ankle sprain, ankle dislocation, Lisfranc fracture, fracture dislocations of the digits, pulmonary embolism, claudication, peripheral vascular disease, gout, osteoarthritis, rheumatoid arthritis, bursitis, septic joint, poly-rheumatica, polyarthralgia and other inflammatory or infectious disease processes.   DIAGNOSIS  Final diagnoses:   Toe pain, right       Latest Documented Vital Signs:  As of 13:28 EST  BP- (!) 176/106 HR- 81 Temp- 98.3 °F (36.8 °C) (Oral) O2 sat- 100%    DISPOSITION  Pt discharged    Discussed pertinent findings with the  patient/family.  Patient/Family voiced understanding of need to follow-up for recheck and further testing as needed.  Return to the Emergency Department warnings were given.         Medication List      No changes were made to your prescriptions during this visit.              Follow-up Information       [facility] - [place]. Call today.    Why: to schedule follow up  Contact information:  [place] [address]  [phone]             Go to  [facility] EMERGENCY DEPARTMENT.    Specialty: Emergency Medicine  Why: If symptoms worsen  Contact information:  [address]  [place] [address] [phone]             Podiatry. Go today.    Why: for further evaluation  Contact information:  Leave the ED and go directly to your podiatry appointment this afternoon.                             Dictated utilizing [employer] dictation     Yuridia Munoz PA-C  12/11/23 8121

## 2024-03-04 RX ORDER — NORETHINDRONE ACETATE AND ETHINYL ESTRADIOL, ETHINYL ESTRADIOL AND FERROUS FUMARATE 1MG-10(24)
1 KIT ORAL DAILY
Qty: 84 TABLET | Refills: 0 | Status: SHIPPED | OUTPATIENT
Start: 2024-03-04

## 2024-03-27 ENCOUNTER — TELEPHONE (OUTPATIENT)
Dept: OBSTETRICS AND GYNECOLOGY | Facility: CLINIC | Age: 49
End: 2024-03-27

## 2024-03-27 NOTE — TELEPHONE ENCOUNTER
Caller: Kendra Mendoza    Relationship to patient: Self    Best call back number: [phone]  OKAY TO [facility]    Chief complaint: PATIENTS  HAD SURGERY AND HIS FOLLOW UP APPT IS THE SAME DAY AS HER ANNUAL 4/18/24. SHE WANTS TO KNOW IF SHE R/S HER ANNUAL FOR THE NEXT AVAIL APPT IN SEPT IF SHE CAN STILL HAVE A BIRTH CONTROL PRESCRIBED MONTHLY UNTIL SHE IS ABLE TO COME IN.    Type of visit: ANNUAL

## 2024-05-14 RX ORDER — NORETHINDRONE ACETATE AND ETHINYL ESTRADIOL, ETHINYL ESTRADIOL AND FERROUS FUMARATE 1MG-10(24)
1 KIT ORAL DAILY
Qty: 84 TABLET | Refills: 0 | Status: SHIPPED | OUTPATIENT
Start: 2024-05-14

## 2024-07-02 ENCOUNTER — TRANSCRIBE ORDERS (OUTPATIENT)
Dept: ADMINISTRATIVE | Facility: HOSPITAL | Age: 49
End: 2024-07-02
Payer: COMMERCIAL

## 2024-07-02 ENCOUNTER — LAB (OUTPATIENT)
Dept: LAB | Facility: HOSPITAL | Age: 49
End: 2024-07-02
Payer: COMMERCIAL

## 2024-07-02 DIAGNOSIS — E78.5 HYPERLIPIDEMIA, UNSPECIFIED HYPERLIPIDEMIA TYPE: ICD-10-CM

## 2024-07-02 DIAGNOSIS — E06.3 CHRONIC LYMPHOCYTIC THYROIDITIS: ICD-10-CM

## 2024-07-02 DIAGNOSIS — I10 ESSENTIAL HYPERTENSION, MALIGNANT: ICD-10-CM

## 2024-07-02 DIAGNOSIS — E06.3 CHRONIC LYMPHOCYTIC THYROIDITIS: Primary | ICD-10-CM

## 2024-07-02 DIAGNOSIS — E03.9 HYPOTHYROIDISM, UNSPECIFIED TYPE: ICD-10-CM

## 2024-07-02 LAB
25(OH)D3 SERPL-MCNC: 77.8 NG/ML (ref 30–100)
ALBUMIN SERPL-MCNC: 4.4 G/DL (ref 3.5–5.2)
ALBUMIN/GLOB SERPL: 1.8 G/DL
ALP SERPL-CCNC: 49 U/L (ref 39–117)
ALT SERPL W P-5'-P-CCNC: 22 U/L (ref 1–33)
ANION GAP SERPL CALCULATED.3IONS-SCNC: 8.8 MMOL/L (ref 5–15)
AST SERPL-CCNC: 25 U/L (ref 1–32)
BILIRUB SERPL-MCNC: 0.5 MG/DL (ref 0–1.2)
BUN SERPL-MCNC: 11 MG/DL (ref 6–20)
BUN/CREAT SERPL: 11.5 (ref 7–25)
CALCIUM SPEC-SCNC: 9.2 MG/DL (ref 8.6–10.5)
CHLORIDE SERPL-SCNC: 106 MMOL/L (ref 98–107)
CHOLEST SERPL-MCNC: 167 MG/DL (ref 0–200)
CO2 SERPL-SCNC: 25.2 MMOL/L (ref 22–29)
CORTIS SERPL-MCNC: 32.4 MCG/DL
CREAT SERPL-MCNC: 0.96 MG/DL (ref 0.57–1)
EGFRCR SERPLBLD CKD-EPI 2021: 72.2 ML/MIN/1.73
GLOBULIN UR ELPH-MCNC: 2.4 GM/DL
GLUCOSE SERPL-MCNC: 92 MG/DL (ref 65–99)
HDLC SERPL-MCNC: 66 MG/DL (ref 40–60)
LDLC SERPL CALC-MCNC: 90 MG/DL (ref 0–100)
LDLC/HDLC SERPL: 1.37 {RATIO}
POTASSIUM SERPL-SCNC: 4 MMOL/L (ref 3.5–5.2)
PROLACTIN SERPL-MCNC: 18.6 NG/ML (ref 4.79–23.3)
PROT SERPL-MCNC: 6.8 G/DL (ref 6–8.5)
PTH-INTACT SERPL-MCNC: 22.9 PG/ML (ref 15–65)
SODIUM SERPL-SCNC: 140 MMOL/L (ref 136–145)
T3FREE SERPL-MCNC: 3.01 PG/ML (ref 2–4.4)
T4 FREE SERPL-MCNC: 1.44 NG/DL (ref 0.92–1.68)
TRIGL SERPL-MCNC: 53 MG/DL (ref 0–150)
TSH SERPL DL<=0.05 MIU/L-ACNC: 3.27 UIU/ML (ref 0.27–4.2)
VLDLC SERPL-MCNC: 11 MG/DL (ref 5–40)

## 2024-07-02 PROCEDURE — 86258 DGP ANTIBODY EACH IG CLASS: CPT

## 2024-07-02 PROCEDURE — 84481 FREE ASSAY (FT-3): CPT

## 2024-07-02 PROCEDURE — 36415 COLL VENOUS BLD VENIPUNCTURE: CPT

## 2024-07-02 PROCEDURE — 82784 ASSAY IGA/IGD/IGG/IGM EACH: CPT

## 2024-07-02 PROCEDURE — 82533 TOTAL CORTISOL: CPT

## 2024-07-02 PROCEDURE — 84146 ASSAY OF PROLACTIN: CPT

## 2024-07-02 PROCEDURE — 86364 TISS TRNSGLTMNASE EA IG CLAS: CPT

## 2024-07-02 PROCEDURE — 86800 THYROGLOBULIN ANTIBODY: CPT

## 2024-07-02 PROCEDURE — 86376 MICROSOMAL ANTIBODY EACH: CPT

## 2024-07-02 PROCEDURE — 84443 ASSAY THYROID STIM HORMONE: CPT

## 2024-07-02 PROCEDURE — 84305 ASSAY OF SOMATOMEDIN: CPT

## 2024-07-02 PROCEDURE — 80061 LIPID PANEL: CPT

## 2024-07-02 PROCEDURE — 80053 COMPREHEN METABOLIC PANEL: CPT

## 2024-07-02 PROCEDURE — 82024 ASSAY OF ACTH: CPT

## 2024-07-02 PROCEDURE — 86231 EMA EACH IG CLASS: CPT

## 2024-07-02 PROCEDURE — 84439 ASSAY OF FREE THYROXINE: CPT

## 2024-07-02 PROCEDURE — 82306 VITAMIN D 25 HYDROXY: CPT

## 2024-07-02 PROCEDURE — 83970 ASSAY OF PARATHORMONE: CPT

## 2024-07-03 LAB
ACTH PLAS-MCNC: 8 PG/ML (ref 7.2–63.3)
ENDOMYSIUM IGA SER QL: NEGATIVE
GLIADIN PEPTIDE IGA SER-ACNC: 3 UNITS (ref 0–19)
GLIADIN PEPTIDE IGG SER-ACNC: 3 UNITS (ref 0–19)
IGA SERPL-MCNC: 103 MG/DL (ref 87–352)
THYROGLOB AB SERPL-ACNC: 13.1 IU/ML (ref 0–0.9)
THYROPEROXIDASE AB SERPL-ACNC: 159 IU/ML (ref 0–34)
TTG IGA SER-ACNC: <2 U/ML (ref 0–3)
TTG IGG SER-ACNC: <2 U/ML (ref 0–5)

## 2024-07-04 LAB — IGF-I SERPL-MCNC: 156 NG/ML (ref 70–225)

## 2024-08-27 RX ORDER — NORETHINDRONE ACETATE AND ETHINYL ESTRADIOL, ETHINYL ESTRADIOL AND FERROUS FUMARATE 1MG-10(24)
1 KIT ORAL DAILY
Qty: 84 TABLET | Refills: 0 | Status: SHIPPED | OUTPATIENT
Start: 2024-08-27

## 2024-10-24 ENCOUNTER — OFFICE VISIT (OUTPATIENT)
Dept: OBSTETRICS AND GYNECOLOGY | Facility: CLINIC | Age: 49
End: 2024-10-24
Payer: COMMERCIAL

## 2024-10-24 VITALS
BODY MASS INDEX: 20.86 KG/M2 | HEIGHT: 66 IN | WEIGHT: 129.8 LBS | SYSTOLIC BLOOD PRESSURE: 128 MMHG | DIASTOLIC BLOOD PRESSURE: 90 MMHG

## 2024-10-24 DIAGNOSIS — Z01.419 ROUTINE GYNECOLOGICAL EXAMINATION: ICD-10-CM

## 2024-10-24 DIAGNOSIS — Z01.419 CERVICAL SMEAR, AS PART OF ROUTINE GYNECOLOGICAL EXAMINATION: ICD-10-CM

## 2024-10-24 DIAGNOSIS — N95.1 MENOPAUSAL SYMPTOMS: Primary | ICD-10-CM

## 2024-10-24 DIAGNOSIS — Z11.51 SCREENING FOR HUMAN PAPILLOMAVIRUS: ICD-10-CM

## 2024-10-24 DIAGNOSIS — Z12.31 ENCOUNTER FOR SCREENING MAMMOGRAM FOR MALIGNANT NEOPLASM OF BREAST: ICD-10-CM

## 2024-10-24 DIAGNOSIS — Z79.890 HORMONE REPLACEMENT THERAPY (HRT): ICD-10-CM

## 2024-10-24 LAB
BILIRUB BLD-MCNC: NEGATIVE MG/DL
CLARITY, POC: CLEAR
COLOR UR: YELLOW
GLUCOSE UR STRIP-MCNC: NEGATIVE MG/DL
KETONES UR QL: NEGATIVE
LEUKOCYTE EST, POC: NEGATIVE
NITRITE UR-MCNC: NEGATIVE MG/ML
PH UR: 6 [PH] (ref 5–8)
PROT UR STRIP-MCNC: NEGATIVE MG/DL
RBC # UR STRIP: NEGATIVE /UL
SP GR UR: 1.02 (ref 1–1.03)
UROBILINOGEN UR QL: NORMAL

## 2024-10-24 RX ORDER — METHYLPREDNISOLONE 4 MG/1
TABLET ORAL
COMMUNITY
Start: 2024-09-17 | End: 2024-11-07

## 2024-10-24 RX ORDER — AZITHROMYCIN 250 MG/1
TABLET, FILM COATED ORAL
COMMUNITY
Start: 2024-09-17 | End: 2024-11-07

## 2024-10-24 RX ORDER — CHLORHEXIDINE GLUCONATE ORAL RINSE 1.2 MG/ML
SOLUTION DENTAL
COMMUNITY
Start: 2024-10-23

## 2024-10-24 RX ORDER — PROGESTERONE 100 MG/1
100 CAPSULE ORAL NIGHTLY
Qty: 30 CAPSULE | Refills: 3 | Status: SHIPPED | OUTPATIENT
Start: 2024-10-24

## 2024-10-24 RX ORDER — CEPHALEXIN 500 MG/1
CAPSULE ORAL
COMMUNITY
Start: 2024-09-17 | End: 2024-11-07

## 2024-10-24 RX ORDER — ESTRADIOL 0.04 MG/D
1 PATCH, EXTENDED RELEASE TRANSDERMAL 2 TIMES WEEKLY
Qty: 8 PATCH | Refills: 3 | Status: SHIPPED | OUTPATIENT
Start: 2024-10-24

## 2024-10-24 RX ORDER — TRIAZOLAM 0.25 MG
TABLET ORAL
COMMUNITY
Start: 2024-09-19

## 2024-10-24 RX ORDER — LOSARTAN POTASSIUM 25 MG/1
TABLET ORAL
COMMUNITY
Start: 2024-05-19 | End: 2024-11-07

## 2024-10-24 RX ORDER — DEXAMETHASONE 1 MG
TABLET ORAL
COMMUNITY
Start: 2024-08-07

## 2024-10-24 RX ORDER — AMLODIPINE BESYLATE 2.5 MG/1
TABLET ORAL
COMMUNITY
Start: 2024-09-23 | End: 2024-11-07

## 2024-10-24 NOTE — PROGRESS NOTES
GYN Annual Exam     CC- Here for annual exam.     Kendra Mendoza is a 49 y.o. female est pt who presents for annual well woman exam to discuss menopausal symptoms.  She had been on low Loestrin continuously and stopped it in August.  She has then had hot flashes and moodiness.  She is also tearful.  She has had a very hard year.  She lost her best friend in .  She does need to get her mammogram done.  She declines Cologuard or colonoscopy.  She has a family history of ovarian cancer but is not interested in BRCA screening.  We discussed checking labs for menopause as well as thyroid.  We also discussed options for menopausal symptoms such as lifestyle changes, OTC meds, SSRIs, HRT as well as Veozah.  She would like to try HRT and see if it helps improve her mood.    OB History          0    Para   0    Term   0       0    AB   0    Living   0         SAB   0    IAB   0    Ectopic   0    Molar   0    Multiple   0    Live Births   0                Menarche: 10  Current contraception: none  History of abnormal Pap smear: no  History of abnormal mammogram: no  Family history of uterine, colon or ovarian cancer: yes - 2nd cousin- colon cancer, MGM ovarian cancer , declines BRCA  Family history of breast cancer: no  H/o STDs: none  Last pap: 2023- ASCUS neg HPV pap smear  Gardasil:missed  BIRGIT: none   H/O pituitary tumor.     Health Maintenance   Topic Date Due    COLORECTAL CANCER SCREENING  Never done    HEPATITIS C SCREENING  Never done    ANNUAL PHYSICAL  Never done    ZOSTER VACCINE (1 of 2) Never done    Annual Gynecologic Pelvic and Breast Exam  2024    INFLUENZA VACCINE  Never done    COVID-19 Vaccine (2024- season) Never done    LIPID PANEL  2025    MAMMOGRAM  10/12/2025    PAP SMEAR  10/24/2027    TDAP/TD VACCINES (2 - Td or Tdap) 2028    Pneumococcal Vaccine 0-64  Aged Out       Past Medical History:   Diagnosis Date    Disease of thyroid gland     History of pituitary  tumor     Hyperlipidemia        Past Surgical History:   Procedure Laterality Date    HEMORRHOIDECTOMY           Current Outpatient Medications:     amLODIPine (NORVASC) 2.5 MG tablet, , Disp: , Rfl:     amphetamine-dextroamphetamine (ADDERALL) 30 MG tablet, , Disp: , Rfl:     atorvastatin (LIPITOR) 40 MG tablet, , Disp: , Rfl:     calcium carbonate (TUMS) 500 MG chewable tablet, Chew 1 tablet Daily., Disp: , Rfl:     chlorhexidine (PERIDEX) 0.12 % solution, , Disp: , Rfl:     Cholecalciferol 25 MCG (1000 UT) tablet, Take 1 tablet by mouth Daily., Disp: , Rfl:     levothyroxine (SYNTHROID, LEVOTHROID) 88 MCG tablet, , Disp: , Rfl:     meloxicam (MOBIC) 15 MG tablet, , Disp: , Rfl:     methylPREDNISolone (MEDROL) 4 MG tablet, , Disp: , Rfl:     multivitamin (THERAGRAN) tablet tablet, Take  by mouth., Disp: , Rfl:     azithromycin (ZITHROMAX) 250 MG tablet, , Disp: , Rfl:     cephalexin (KEFLEX) 500 MG capsule, , Disp: , Rfl:     dexAMETHasone (DECADRON) 1 MG tablet, , Disp: , Rfl:     estradiol (VIVELLE-DOT) 0.0375 MG/24HR patch, Place 1 patch on the skin as directed by provider 2 (Two) Times a Week., Disp: 8 patch, Rfl: 3    fluocinonide (LIDEX) 0.05 % cream, APPLY TO AFFECTED AREA TWICE A DAY UNTIL DIRECTED TO STOP, Disp: , Rfl:     losartan (COZAAR) 25 MG tablet, , Disp: , Rfl:     NON FORMULARY, 1 tablet Daily., Disp: , Rfl:     Progesterone (PROMETRIUM) 100 MG capsule, Take 1 capsule by mouth Every Night., Disp: 30 capsule, Rfl: 3    Synthroid 75 MCG tablet, , Disp: , Rfl:     triazolam (HALCION) 0.25 MG tablet, , Disp: , Rfl:     Allergies   Allergen Reactions    Latex Anaphylaxis and Swelling     Lips swelled at the dentist office    Erythromycin Hives and Rash    Penicillins Hives and Rash       Social History     Tobacco Use    Smoking status: Never   Vaping Use    Vaping status: Never Used   Substance Use Topics    Alcohol use: Never    Drug use: Never       Family History   Problem Relation Age of Onset     "Ovarian cancer Paternal Grandmother     Colon cancer Other     Breast cancer Neg Hx     Uterine cancer Neg Hx     Deep vein thrombosis Neg Hx     Pulmonary embolism Neg Hx        Review of Systems   Constitutional:  Positive for activity change. Negative for appetite change, fatigue, fever and unexpected weight change.   Eyes:  Negative for photophobia and visual disturbance.   Respiratory:  Negative for cough and shortness of breath.    Cardiovascular:  Negative for chest pain and palpitations.   Gastrointestinal:  Negative for abdominal distention, abdominal pain, constipation, diarrhea and nausea.   Endocrine: Positive for cold intolerance and heat intolerance.   Genitourinary:  Negative for dyspareunia, dysuria, menstrual problem, pelvic pain, vaginal bleeding and vaginal discharge.   Musculoskeletal:  Negative for back pain.   Skin:  Negative for color change and rash.   Neurological:  Negative for headaches.   Hematological:  Negative for adenopathy. Does not bruise/bleed easily.   Psychiatric/Behavioral:  Positive for decreased concentration, dysphoric mood and sleep disturbance. The patient is nervous/anxious (grief).        /90   Ht 167.6 cm (65.98\")   Wt 58.9 kg (129 lb 12.8 oz)   BMI 20.96 kg/m²     Physical Exam  Vitals and nursing note reviewed. Exam conducted with a chaperone present.   Constitutional:       Appearance: Normal appearance. She is well-developed and normal weight.   HENT:      Head: Normocephalic and atraumatic.   Eyes:      General: No scleral icterus.     Conjunctiva/sclera: Conjunctivae normal.   Neck:      Thyroid: No thyromegaly.   Cardiovascular:      Rate and Rhythm: Normal rate and regular rhythm.   Pulmonary:      Effort: Pulmonary effort is normal.      Breath sounds: Normal breath sounds.   Chest:   Breasts:     Right: No swelling, bleeding, inverted nipple, mass, nipple discharge, skin change or tenderness.      Left: No swelling, bleeding, inverted nipple, mass, " nipple discharge, skin change or tenderness.   Abdominal:      General: Bowel sounds are normal. There is no distension.      Palpations: Abdomen is soft. There is no mass.      Tenderness: There is no abdominal tenderness. There is no guarding or rebound.      Hernia: No hernia is present.   Genitourinary:     Exam position: Supine.      Labia:         Right: No rash, tenderness or lesion.         Left: No rash, tenderness or lesion.       Urethra: No prolapse, urethral pain, urethral swelling or urethral lesion.      Vagina: No signs of injury and foreign body. No vaginal discharge, erythema, tenderness or bleeding.      Cervix: No cervical motion tenderness, discharge or friability.      Uterus: Not deviated, not enlarged, not fixed and not tender.       Adnexa:         Right: No mass, tenderness or fullness.          Left: No mass, tenderness or fullness.     Musculoskeletal:      Cervical back: Neck supple.   Skin:     General: Skin is warm and dry.   Neurological:      Mental Status: She is alert and oriented to person, place, and time.   Psychiatric:         Mood and Affect: Affect is tearful.         Behavior: Behavior normal.         Thought Content: Thought content normal.         Judgment: Judgment normal.            Assessment/Plan    1) GYN HM: 4/2023- ASCUS neg HPV pap, check pap/HPV  SBE demonstrated and encouraged.  2) STD screening: declines Condoms encouraged.  3) Contraception: stopped OCPS in 8/2024, was taking continuously so cannot evaluate for menopause.  4) Family Planning: family planning: never wants children, encourage folic acid daily  5) Diet and Exercise discussed  6) Smoking Status: No  7) Family history of ovarian cancer- pt declines BRCA testing, [employer] info given  8) MMG- UTD 10/2023 JESUS Núñez MMG now, prefers Camron.   9) premenopausal-check FSH, E2, TSH and prolactin  10) H/O pituitary tumor- check prolactin  11) Declines Cscope and Cologuard.   12) Perimenopausal symptoms -  patient would like to try HRT.  Vivelle-Dot 0.0375 mg twice a week and Prometrium 100 mg nightly prescribed.  RTO 6 weeks to recheck symptoms. Patient counseled that hormone treatment should be used to help with specific symptoms related to menopause such as hot flashes, night sweats and vaginal atrophy.  Hormones should not be given for “general wellbeing” or to avoid aging. The lowest dose hormone that treats symptoms should be used for the shortest amount of time possible.  Although estrogen is the most effective treatment for hot flashes, other non hormonal options exist (such as Brisdelle or venlafaxine) and should also be considered.  Anyone with an intact uterus should also receive progestogen to prevent uterine overgrowth that can lead to uterine cancer.  All hormone therapy, whether it is synthetic or bio identical, can lead to increased risk of stroke, heart attack and thromboembolic diseases.  These adverse events are more likely to develop in the first year of use and in patients who are older than the typical menopausal age.  Risks of estrogen dependent cancers such as breast cancer increase with prolonged use.  Attempts to wean hormone therapy should be discussed annually and particularly after three to five years of use.  Any side effects such as vaginal bleeding or pain should be reported immediately.    13) Parts of this document have been copied or forwarded from her previous visits and have been reviewed, updated and edited as indicated.   14) RTO 6 weeks f/u HRT and 1 year annual           Diagnoses and all orders for this visit:    1. Menopausal symptoms (Primary)  -     Follicle Stimulating Hormone  -     TSH  -     Estradiol  -     Prolactin    2. Routine gynecological examination  -     POC Urinalysis Dipstick  -     IGP, Apt HPV,rfx 16 / 18,45    3. Cervical smear, as part of routine gynecological examination  -     IGP, Apt HPV,rfx 16 / 18,45    4. Screening for human papillomavirus  -      IGP, Apt HPV,rfx 16 / 18,45    5. Encounter for screening mammogram for malignant neoplasm of breast  -     Mammo Screening Digital Tomosynthesis Bilateral With CAD; Future    6. Hormone replacement therapy (HRT)    Other orders  -     estradiol (VIVELLE-DOT) 0.0375 MG/24HR patch; Place 1 patch on the skin as directed by provider 2 (Two) Times a Week.  Dispense: 8 patch; Refill: 3  -     Progesterone (PROMETRIUM) 100 MG capsule; Take 1 capsule by mouth Every Night.  Dispense: 30 capsule; Refill: 3          Kimberly Harrington MD  10/24/2024    08:38 EST

## 2024-10-25 LAB
ESTRADIOL SERPL-MCNC: <5 PG/ML
FSH SERPL-ACNC: 104 MIU/ML
PROLACTIN SERPL-MCNC: 17.6 NG/ML (ref 3.6–25.2)
TSH SERPL DL<=0.005 MIU/L-ACNC: 0.34 UIU/ML (ref 0.45–4.5)

## 2024-10-28 NOTE — PROGRESS NOTES
PIP= labs indicate menopause. Her TSH is abnormal. Please add a thyroid panel and TPO on to the blood in her lab and/or have her come in and have her blood drawn again.

## 2024-10-31 ENCOUNTER — LAB (OUTPATIENT)
Dept: OBSTETRICS AND GYNECOLOGY | Facility: CLINIC | Age: 49
End: 2024-10-31
Payer: COMMERCIAL

## 2024-10-31 DIAGNOSIS — N95.1 MENOPAUSAL SYMPTOMS: Primary | ICD-10-CM

## 2024-10-31 LAB
CYTOLOGIST CVX/VAG CYTO: ABNORMAL
CYTOLOGY CVX/VAG DOC CYTO: ABNORMAL
CYTOLOGY CVX/VAG DOC THIN PREP: ABNORMAL
DX ICD CODE: ABNORMAL
DX ICD CODE: ABNORMAL
HPV I/H RISK 4 DNA CVX QL PROBE+SIG AMP: NEGATIVE
Lab: ABNORMAL
OTHER STN SPEC: ABNORMAL
PATHOLOGIST CVX/VAG CYTO: ABNORMAL
RECOM F/U CVX/VAG CYTO: ABNORMAL
STAT OF ADQ CVX/VAG CYTO-IMP: ABNORMAL

## 2024-11-01 DIAGNOSIS — R76.8 ANTI-TPO ANTIBODIES PRESENT: Primary | ICD-10-CM

## 2024-11-01 LAB
T3 SERPL-MCNC: 116 NG/DL (ref 80–200)
T4 FREE SERPL-MCNC: 1.41 NG/DL (ref 0.92–1.68)
THYROPEROXIDASE AB SERPL-ACNC: 153 IU/ML (ref 0–34)

## 2024-11-01 NOTE — PROGRESS NOTES
PIP= normal thyroid functions but thyroid antibodies are present. Schedule thyroid US and pt needs to have an US thyroid. Please arrange

## 2024-11-04 ENCOUNTER — TELEPHONE (OUTPATIENT)
Dept: OBSTETRICS AND GYNECOLOGY | Facility: CLINIC | Age: 49
End: 2024-11-04
Payer: COMMERCIAL

## 2024-11-04 NOTE — TELEPHONE ENCOUNTER
Pt has been taking her HRT medication for 1 week .  Pt states she is having insomnia and depression feels worse. Pt is exhausted and not sleeping. Pt is wondering if she should go up in dose, or stop medication? She was crying and states she feels worse

## 2024-11-07 ENCOUNTER — OFFICE VISIT (OUTPATIENT)
Dept: OBSTETRICS AND GYNECOLOGY | Facility: CLINIC | Age: 49
End: 2024-11-07
Payer: COMMERCIAL

## 2024-11-07 VITALS
BODY MASS INDEX: 20.38 KG/M2 | HEIGHT: 66 IN | WEIGHT: 126.8 LBS | SYSTOLIC BLOOD PRESSURE: 116 MMHG | DIASTOLIC BLOOD PRESSURE: 68 MMHG

## 2024-11-07 DIAGNOSIS — F41.9 ANXIETY: Primary | ICD-10-CM

## 2024-11-07 DIAGNOSIS — Z71.89 GRIEF COUNSELING: ICD-10-CM

## 2024-11-07 DIAGNOSIS — G47.09 OTHER INSOMNIA: ICD-10-CM

## 2024-11-07 RX ORDER — BUPROPION HYDROCHLORIDE 150 MG/1
150 TABLET ORAL EVERY MORNING
Qty: 30 TABLET | Refills: 2 | Status: SHIPPED | OUTPATIENT
Start: 2024-11-07 | End: 2025-11-07

## 2024-11-07 RX ORDER — LEVOTHYROXINE SODIUM 88 UG/1
88 TABLET ORAL DAILY
COMMUNITY
Start: 2024-08-09

## 2024-11-07 RX ORDER — LORAZEPAM 1 MG/1
1 TABLET ORAL 2 TIMES DAILY PRN
Qty: 10 TABLET | Refills: 0 | Status: SHIPPED | OUTPATIENT
Start: 2024-11-07

## 2024-11-07 NOTE — PROGRESS NOTES
"      Kendra Mendoza is a 49 y.o. patient who presents for follow up of   Chief Complaint   Patient presents with    Follow-up       49-year-old established patient here for emergency appointment for anxiety and depression.  She was seen for an annual in last month and had stopped OCPs and did not have a cycle.  She was having hot flashes and night sweats and so was started on HRT.  She called this week complaining of worsening insomnia and worsening depression.  She is having significant grief regarding the fact that she would never have a biological child.  She thought that she had come to terms with this situation, however, menopause has brought all of these issues back to the forefront.  We had a long discussion about the unique grief of infertility.  She declines referral to counseling at this time.  I do think that she would benefit from being on an antidepressant.  She has previously been on Wellbutrin when her father  and would like to restart that medication.  She is also having significant insomnia and so we will do a short course of Ativan before bedtime.  I given her my cell phone number and advised her to call if she has any issues or concerns.  I would like to see her back in the office in 2 weeks for a recheck of symptoms.  She is to call for any worsening of condition      The following portions of the patient's history were reviewed and updated as appropriate: allergies, current medications and problem list.    Review of Systems   Constitutional:  Positive for activity change, appetite change and fatigue.   Psychiatric/Behavioral:  Positive for dysphoric mood and sleep disturbance. The patient is nervous/anxious.        /68   Ht 167.6 cm (65.98\")   Wt 57.5 kg (126 lb 12.8 oz)   LMP  (LMP Unknown) Comment: years ago last cycle  BMI 20.48 kg/m²     Physical Exam  Vitals and nursing note reviewed.   Constitutional:       Appearance: She is well-developed.   HENT:      Head: Normocephalic and " atraumatic.   Eyes:      General: No scleral icterus.     Conjunctiva/sclera: Conjunctivae normal.   Neck:      Thyroid: No thyromegaly.   Abdominal:      General: Bowel sounds are normal. There is no distension.      Palpations: Abdomen is soft. There is no mass.      Tenderness: There is no abdominal tenderness. There is no guarding or rebound.      Hernia: No hernia is present.   Skin:     General: Skin is warm and dry.   Neurological:      Mental Status: She is alert and oriented to person, place, and time.   Psychiatric:         Mood and Affect: Mood is anxious. Affect is tearful.         Behavior: Behavior normal.         Thought Content: Thought content normal.         Judgment: Judgment normal.         A/P:  1. Anxiety- ERX Wellbutrin  mg. QD   2. Grief- Time Spent: I spent > 30  minutes caring for Kendra on this date of service. This time includes time spent by me in the following activities: preparing for the visit, obtaining and/or reviewing a separately obtained history, performing a medically appropriate examination and/or evaluation, counseling and educating the patient/family/caregiver, ordering medications, tests, or procedures, and documenting information in the medical record.   3. Insomnia- Ativan 1 mg BID prn # 10.   4. HRT- continue same dose  5. RTO 2 weeks recheck symptoms.       Assessment & Plan   Diagnoses and all orders for this visit:    1. Anxiety (Primary)  -     LORazepam (Ativan) 1 MG tablet; Take 1 tablet by mouth 2 (Two) Times a Day As Needed for Anxiety or Sleep.  Dispense: 10 tablet; Refill: 0    2. Grief counseling    3. Other insomnia    Other orders  -     buPROPion XL (Wellbutrin XL) 150 MG 24 hr tablet; Take 1 tablet by mouth Every Morning.  Dispense: 30 tablet; Refill: 2                 No follow-ups on file.      Kimberly Harrington MD    11/7/2024  12:19 EST

## 2024-11-21 ENCOUNTER — OFFICE VISIT (OUTPATIENT)
Dept: OBSTETRICS AND GYNECOLOGY | Facility: CLINIC | Age: 49
End: 2024-11-21
Payer: COMMERCIAL

## 2024-11-21 VITALS
BODY MASS INDEX: 20.89 KG/M2 | WEIGHT: 130 LBS | HEIGHT: 66 IN | DIASTOLIC BLOOD PRESSURE: 78 MMHG | SYSTOLIC BLOOD PRESSURE: 122 MMHG

## 2024-11-21 DIAGNOSIS — T50.905A ADVERSE EFFECT OF DRUG, INITIAL ENCOUNTER: ICD-10-CM

## 2024-11-21 DIAGNOSIS — Z79.890 HORMONE REPLACEMENT THERAPY (HRT): Primary | ICD-10-CM

## 2024-11-21 DIAGNOSIS — F41.9 ANXIETY: ICD-10-CM

## 2024-11-21 PROBLEM — Z30.011 VISIT FOR ORAL CONTRACEPTIVE PRESCRIPTION: Status: RESOLVED | Noted: 2023-04-18 | Resolved: 2024-11-21

## 2024-11-21 RX ORDER — PROGESTERONE 100 MG/1
100 CAPSULE ORAL NIGHTLY
Qty: 90 CAPSULE | Refills: 1 | Status: SHIPPED | OUTPATIENT
Start: 2024-11-21

## 2024-11-21 RX ORDER — ESCITALOPRAM OXALATE 5 MG/1
5 TABLET ORAL DAILY
Qty: 30 TABLET | Refills: 2 | Status: SHIPPED | OUTPATIENT
Start: 2024-11-21

## 2024-11-21 RX ORDER — ESTRADIOL 0.04 MG/D
1 PATCH, EXTENDED RELEASE TRANSDERMAL 2 TIMES WEEKLY
Qty: 24 PATCH | Refills: 1 | Status: SHIPPED | OUTPATIENT
Start: 2024-11-21

## 2024-11-21 NOTE — PROGRESS NOTES
Kendra Mendoza is a 49 y.o. patient who presents for follow up of   Chief Complaint   Patient presents with    Follow-up     Meds     49-year-old established patient here for 2-week follow-up.  She stopped OCPs at 49 and did not have a cycle but was having hot flashes and night sweats and was started on HRT.  She is on Prometrium as well as Vivelle-Dot and feels that these are finally helping.  Her hot flashes and night sweats have improved.  She was also having worsening anxiety and depression.  She is having significant grief regarding the ending of her fertile window.  She had wanted to try Wellbutrin because she had taken it previously, however, this time she took it and it caused severe constipation she had to stop.  She is accompanied by her  who reports that she is either crying or about to cry at any given time.  She is not interested in counseling at this time but is agreeable to trying a new medication and we will start her on low-dose Lexapro.  We also discussed possible consult with [facility] to discuss fertility options.  She is not interested at this time.  She was given a prescription for Ativan for her insomnia.  She took the complete prescription and says she slept but had very vivid and somewhat disturbing dreams.  She seems to be a little bit more stable so we will hold off on giving her any Ativan for now and see if the Lexapro can help stabilize some of her sleep schedules.        The following portions of the patient's history were reviewed and updated as appropriate: allergies, current medications and problem list.    Review of Systems   Constitutional:  Positive for activity change and fatigue. Negative for appetite change.   Gastrointestinal:  Positive for constipation.   Endocrine: Negative for cold intolerance and heat intolerance.   Psychiatric/Behavioral:  Positive for dysphoric mood. Negative for sleep disturbance. The patient is nervous/anxious.        /78   Ht 167.6 cm  "(65.98\")   Wt 59 kg (130 lb)   LMP  (LMP Unknown) Comment: years ago last cycle  BMI 21.00 kg/m²     Physical Exam  Vitals and nursing note reviewed. Exam conducted with a chaperone present.   Constitutional:       Appearance: Normal appearance. She is well-developed.   HENT:      Head: Normocephalic and atraumatic.   Eyes:      General: No scleral icterus.     Conjunctiva/sclera: Conjunctivae normal.   Neck:      Thyroid: No thyromegaly.   Abdominal:      General: There is no distension.      Palpations: Abdomen is soft. There is no mass.      Tenderness: There is no abdominal tenderness. There is no guarding or rebound.      Hernia: No hernia is present.   Skin:     General: Skin is warm and dry.   Neurological:      Mental Status: She is alert and oriented to person, place, and time.   Psychiatric:         Mood and Affect: Affect is tearful.         Behavior: Behavior normal.         Thought Content: Thought content normal.         Judgment: Judgment normal.         A/P:  1. HF- doing better on HRT. Refills called in.   2. Anxiety and depression- did not tolerate Wellbutrin. Willing to try Lexapro 5 mg. RTO in 2 weeks to recheck symptoms. Will call for any worsening symptoms. Declines referral to counseling or psychiatry for now  3. Infertility- discussed egg and embryo adoption. [facility] info given. Declines referral for now.   4. RTO 2 weeks recheck symptoms  5. Time Spent: I spent > 30  minutes caring for Kendra on this date of service. This time includes time spent by me in the following activities: preparing for the visit, obtaining and/or reviewing a separately obtained history, performing a medically appropriate examination and/or evaluation, counseling and educating the patient/family/caregiver, ordering medications, tests, or procedures, and documenting information in the medical record.       Assessment & Plan   Diagnoses and all orders for this visit:    1. Hormone replacement therapy (HRT) " (Primary)    2. Anxiety    3. Adverse effect of drug, initial encounter    Other orders  -     escitalopram (Lexapro) 5 MG tablet; Take 1 tablet by mouth Daily.  Dispense: 30 tablet; Refill: 2  -     estradiol (VIVELLE-DOT) 0.0375 MG/24HR patch; Place 1 patch on the skin as directed by provider 2 (Two) Times a Week.  Dispense: 24 patch; Refill: 1  -     Progesterone (PROMETRIUM) 100 MG capsule; Take 1 capsule by mouth Every Night.  Dispense: 90 capsule; Refill: 1                 No follow-ups on file.      Kimberly Harrington MD    11/21/2024  12:53 EST

## 2024-12-05 ENCOUNTER — OFFICE VISIT (OUTPATIENT)
Dept: OBSTETRICS AND GYNECOLOGY | Facility: CLINIC | Age: 49
End: 2024-12-05
Payer: COMMERCIAL

## 2024-12-05 VITALS
SYSTOLIC BLOOD PRESSURE: 140 MMHG | WEIGHT: 132.2 LBS | DIASTOLIC BLOOD PRESSURE: 74 MMHG | HEIGHT: 66 IN | BODY MASS INDEX: 21.24 KG/M2

## 2024-12-05 DIAGNOSIS — Z51.81 MEDICATION MONITORING ENCOUNTER: ICD-10-CM

## 2024-12-05 DIAGNOSIS — Z13.89 SCREENING FOR GENITOURINARY CONDITION: Primary | ICD-10-CM

## 2024-12-05 DIAGNOSIS — Z79.890 HORMONE REPLACEMENT THERAPY (HRT): ICD-10-CM

## 2024-12-05 DIAGNOSIS — F32.A ANXIETY AND DEPRESSION: ICD-10-CM

## 2024-12-05 DIAGNOSIS — F41.9 ANXIETY AND DEPRESSION: ICD-10-CM

## 2024-12-05 RX ORDER — ESCITALOPRAM OXALATE 5 MG/1
5 TABLET ORAL DAILY
Qty: 90 TABLET | Refills: 2 | Status: SHIPPED | OUTPATIENT
Start: 2024-12-05

## 2024-12-05 NOTE — PROGRESS NOTES
"      Kendra Mendoza is a 49 y.o. patient who presents for follow up of   Chief Complaint   Patient presents with    Follow-up     49-year-old established patient here for follow-up of HRT.  She stopped OCPs at 49 and did not have a cycle but was having hot flashes and night sweats she was started on Prometrium as well as Vivelle-Dot.  She has also had significant anxiety and depression related to the end of her fertility..  We tried Wellbutrin but she had significant constipation and had to stop.  We then started her on Lexapro 5 mg.  She said she is at least 50% better.  She is not having daily crying jags.  She feels that she is on the right dose.  She also took herself off of Adderall because it was affecting her sleep.  Her HRT is still helping with hot flashes.  Thus far she is feels like she is doing better and does not want to change any medication at this time.        The following portions of the patient's history were reviewed and updated as appropriate: allergies, current medications and problem list.    Review of Systems   Constitutional:  Positive for activity change.   Endocrine: Negative for heat intolerance.   Genitourinary:  Negative for vaginal bleeding, vaginal discharge and vaginal pain.   Psychiatric/Behavioral:  Positive for dysphoric mood (Improved). Negative for sleep disturbance. The patient is nervous/anxious (Improved).        /74   Ht 167.6 cm (65.98\")   Wt 60 kg (132 lb 3.2 oz)   LMP  (LMP Unknown) Comment: years ago last cycle  BMI 21.35 kg/m²     Physical Exam  Vitals and nursing note reviewed.   Constitutional:       Appearance: Normal appearance. She is well-developed.   HENT:      Head: Normocephalic and atraumatic.   Eyes:      General: No scleral icterus.     Conjunctiva/sclera: Conjunctivae normal.   Neck:      Thyroid: No thyromegaly.   Skin:     General: Skin is warm and dry.   Neurological:      Mental Status: She is alert and oriented to person, place, and time. "   Psychiatric:         Mood and Affect: Mood normal.         Behavior: Behavior normal.         Thought Content: Thought content normal.         Judgment: Judgment normal.      Comments: Affect much brighter         A/P:  1. HRT- pt doing well on current dose. No changes  2. Anxiety and depression-patient improved on Lexapro 5 mg.  Wants to continue at this dose.  Refills called in.  3. RTO 2 months to recheck symptoms. Pt to call for any concerns.     Assessment & Plan   Diagnoses and all orders for this visit:    1. Screening for genitourinary condition (Primary)  -     Cancel: POC Urinalysis Dipstick    2. Hormone replacement therapy (HRT)    3. Anxiety and depression    4. Medication monitoring encounter    Other orders  -     escitalopram (Lexapro) 5 MG tablet; Take 1 tablet by mouth Daily.  Dispense: 90 tablet; Refill: 2                 No follow-ups on file.      Kimberly Harrington MD    12/5/2024  09:11 EST

## 2025-03-07 ENCOUNTER — TELEPHONE (OUTPATIENT)
Dept: ORTHOPEDIC SURGERY | Facility: CLINIC | Age: 50
End: 2025-03-07

## 2025-03-07 NOTE — TELEPHONE ENCOUNTER
Caller: Kendra Mendoza    Relationship to patient: Self    Best call back number: [phone]    Chief complaint: LEFT SHOULDER    Type of visit: NEW PATIENT    Requested date:  ASAP    If rescheduling, when is the original appointment:  03/17/25    Additional notes: PATIENT STATES SHOULDER PAIN IS GETTING WORSE, SHE IS CONCERNED SHE IS DOING MORE DAMAGE

## 2025-03-17 ENCOUNTER — OFFICE VISIT (OUTPATIENT)
Dept: ORTHOPEDIC SURGERY | Facility: CLINIC | Age: 50
End: 2025-03-17
Payer: COMMERCIAL

## 2025-03-17 VITALS
HEART RATE: 70 BPM | BODY MASS INDEX: 21.21 KG/M2 | DIASTOLIC BLOOD PRESSURE: 80 MMHG | HEIGHT: 66 IN | SYSTOLIC BLOOD PRESSURE: 126 MMHG | WEIGHT: 132 LBS

## 2025-03-17 DIAGNOSIS — M25.512 LEFT SHOULDER PAIN, UNSPECIFIED CHRONICITY: Primary | ICD-10-CM

## 2025-03-17 DIAGNOSIS — M75.02 ADHESIVE CAPSULITIS OF LEFT SHOULDER: ICD-10-CM

## 2025-03-17 PROCEDURE — 20610 DRAIN/INJ JOINT/BURSA W/O US: CPT | Performed by: ORTHOPAEDIC SURGERY

## 2025-03-17 PROCEDURE — 99203 OFFICE O/P NEW LOW 30 MIN: CPT | Performed by: ORTHOPAEDIC SURGERY

## 2025-03-17 RX ORDER — AMLODIPINE BESYLATE 5 MG/1
5 TABLET ORAL DAILY
COMMUNITY
Start: 2025-01-16

## 2025-03-17 RX ORDER — LIDOCAINE HYDROCHLORIDE 10 MG/ML
4 INJECTION, SOLUTION EPIDURAL; INFILTRATION; INTRACAUDAL; PERINEURAL
Status: COMPLETED | OUTPATIENT
Start: 2025-03-17 | End: 2025-03-17

## 2025-03-17 RX ORDER — TRIAMCINOLONE ACETONIDE 40 MG/ML
80 INJECTION, SUSPENSION INTRA-ARTICULAR; INTRAMUSCULAR
Status: COMPLETED | OUTPATIENT
Start: 2025-03-17 | End: 2025-03-17

## 2025-03-17 RX ADMIN — TRIAMCINOLONE ACETONIDE 80 MG: 40 INJECTION, SUSPENSION INTRA-ARTICULAR; INTRAMUSCULAR at 10:19

## 2025-03-17 RX ADMIN — LIDOCAINE HYDROCHLORIDE 4 ML: 10 INJECTION, SOLUTION EPIDURAL; INFILTRATION; INTRACAUDAL; PERINEURAL at 10:19
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
## 2025-03-18 ENCOUNTER — PATIENT ROUNDING (BHMG ONLY) (OUTPATIENT)
Dept: ORTHOPEDIC SURGERY | Facility: CLINIC | Age: 50
End: 2025-03-18
Payer: COMMERCIAL

## 2025-03-18 NOTE — PROGRESS NOTES
A My-Chart message has been sent to the patient for PATIENT ROUNDING with [facility].

## 2025-03-19 ENCOUNTER — HOSPITAL ENCOUNTER (OUTPATIENT)
Dept: PHYSICAL THERAPY | Facility: HOSPITAL | Age: 50
Setting detail: THERAPIES SERIES
Discharge: HOME | End: 2025-03-19
Payer: COMMERCIAL

## 2025-03-19 DIAGNOSIS — M75.02 ADHESIVE CAPSULITIS OF LEFT SHOULDER: ICD-10-CM

## 2025-03-19 DIAGNOSIS — M25.512 LEFT SHOULDER PAIN, UNSPECIFIED CHRONICITY: Primary | ICD-10-CM

## 2025-03-19 PROCEDURE — 97161 PT EVAL LOW COMPLEX 20 MIN: CPT | Performed by: PHYSICAL THERAPIST

## 2025-03-19 NOTE — THERAPY EVALUATION
"  Outpatient Physical Therapy Ortho Initial Evaluation   [facility]     Patient Name: Kendra Mendoza  : 1975  MRN: [id number]  Today's Date: 3/19/2025      Visit Date: 2025    Patient Active Problem List   Diagnosis    History of pituitary tumor    Family history of ovarian cancer    Hematuria        Past Medical History:   Diagnosis Date    Anxiety     Depression     Disease of thyroid gland     Female infertility     History of pituitary tumor     Hyperlipidemia     Hypertension 2020    Osteopenia 2010    Varicella 1978        Past Surgical History:   Procedure Laterality Date    HEMORRHOIDECTOMY      WISDOM TOOTH EXTRACTION         Visit Dx:     ICD-10-CM ICD-9-CM   1. Left shoulder pain, unspecified chronicity  M25.512 719.41   2. Adhesive capsulitis of left shoulder  M75.02 726.0          Patient History       Row Name 25 0700             History    Chief Complaint Pain;Difficulty with daily activities  -SP      Type of Pain Shoulder pain  -SP      Date Current Problem(s) Began --  2024  -SP      Brief Description of Current Complaint Patient reports she began having issues with left shoulder in 2024.   Initially thought she had slept on her shoulder \"wrong\" but symptoms continue to worsen.  She noticed pain and loss of motion.  She saw Dr. Bone and had an injection on Monday and it has really helped.  -SP      Previous treatment for THIS PROBLEM Injections;Medication  -SP      Patient's Rating of General Health Very good  -SP      Hand Dominance right-handed  -SP      Occupation/sports/leisure activities retired teacher  -SP         Pain     Pain Location Shoulder  -SP      Pain at Present 1  -SP      Pain at Worst 8  -SP      Pain Frequency Intermittent  -SP      Pain Description Aching  -SP      What Performance Factors Make the Current Problem(s) WORSE? reaching away from body, reaching behind self, take shirt off overhead  -SP      What Performance Factors Make the " Current Problem(s) BETTER? ice and heat  -SP      Tolerance Time- Standing unlimited  -SP      Tolerance Time- Sitting unlimited  -SP      Tolerance Time- Walking unlimited  -SP      Is your sleep disturbed? Yes  -SP      What position do you sleep in? Left sidelying;Supine  -SP      Difficulties with ADL's? fixing hair, reaching overhead and getting things out of cabinet, reaching behind self  -SP         Fall Risk Assessment    Any falls in the past year: No  -SP         Daily Activities    Primary Language English  -SP      Are you able to read Yes  -SP      Are you able to write Yes  -SP      How does patient learn best? Listening;Reading;Demonstration;Pictures/Video  -SP      Teaching needs identified Home Exercise Program;Management of Condition  -SP      Patient is concerned about/has problems with Reaching over head;Performing home management (household chores, shopping, care of dependents)  -SP      Does patient have problems with the following? None  -SP      Barriers to learning None  -SP      Pt Participated in POC and Goals Yes  -SP         Safety    Are you being hurt, hit, or frightened by anyone at home or in your life? No  -SP      Are you being neglected by a caregiver No  -SP                User Key  (r) = Recorded By, (t) = Taken By, (c) = Cosigned By      Initials Name Provider Type    Litzy Mendez, PT Physical Therapist                     PT Ortho       Row Name 03/19/25 0700       Posture/Observations    Posture/Observations Comments no edema, atrophy or abnormaties observed  -SP       Shoulder Girdle Accessory Motions    Posterior glide of humerus Left:;Hypomobile  -SP    Inferior glide of humerus Left:;Hypomobile  -SP    Long axis distraction Left:;Hypomobile  -SP       Shoulder Impingement/Rotator Cuff Special Tests    Ram-Leroy Test (RC Lesion vs. Bursitis) Left:;Positive  -SP    Neer Impingement Test (RC Lesion vs. Bursitis) Left:;Positive  -SP    Empty Can Test (RC  Lesion) Left:;Negative  pain with testing  -SP       Shoulder Girdle Palpation    Subacromial Space Left:;Tender  -SP       Left Upper Ext    Lt Shoulder Abduction AROM 108 degrees with pain  -SP    Lt Shoulder Abduction PROM 150 degrees with pain  -SP    Lt Shoulder Flexion AROM 120 degrees with pain  -SP    Lt Shoulder Flexion PROM 135 degrees with pain  -SP    Lt Shoulder External Rotation AROM able to reach to back of head with pain  -SP    Lt Shoulder External Rotation PROM 50 degrees with pain  -SP    Lt Shoulder Internal Rotation AROM able to reach to lateral left iliac crest with pain  -SP    Lt Shoulder Internal Rotation PROM 38 degrees with pain  -SP    Lt Elbow Extension/Flexion AROM WFL  -SP       MMT Left Upper Ext    Lt Shoulder Flexion MMT, Gross Movement (4-/5) good minus  -SP    Lt Shoulder Extension MMT, Gross Movement (4/5) good  -SP    Lt Shoulder ABduction MMT, Gross Movement (3+/5) fair plus  -SP    Lt Shoulder Internal Rotation MMT, Gross Movement (4-/5) good minus  -SP    Lt Shoulder External Rotation MMT, Gross Movement (3+/5) fair plus  -SP    Lt Scapular ADduction MMT, Gross Movement (3-/5) fair minus  -SP    Lt Elbow Flexion MMT, Gross Movement (4/5) good  -SP    Lt Elbow Extension MMT, Gross Movement (4/5) good  -SP       Sensation    Sensation WNL? WFL  -SP       Upper Extremity Flexibility    Upper Trapezius Left:;Mildly limited  -SP    Pect Minor Left:;Moderately limited  -SP    Pect Major Left:;Moderately limited  -SP    Subscapularis Left:;Moderately limited  -SP       Transfers    Bed-Chair [place] (Transfers) independent  -SP    Chair-Bed [place] (Transfers) independent  -SP    Sit-Stand [place] (Transfers) independent  -SP    Stand-Sit [place] (Transfers) independent  -SP       Gait/Stairs (Locomotion)    [place] Level (Gait) independent  -SP              User Key  (r) = Recorded By, (t) = Taken By, (c) = Cosigned By      Initials Name Provider Type    SP  Litzy Quinn, PT Physical Therapist                                Therapy Education  Given: HEP  Program: New  How Provided: Verbal, Written  Provided to: Patient  Level of Understanding: Verbalized, Demonstrated      PT OP Goals       Row Name 03/19/25 0800          PT Short Term Goals    STG Date to Achieve 04/03/25  -SP     STG 1 Patient demonstrates AROM left shoulder flexion to >135 degrees  -SP     STG 2 Patient reports she is better able to fix her hair without increased pain or compensation  -SP     STG 3 Patient able to sleep through night without waking due to left shoulder pain  -SP     STG 4 Patient demonstrates left shoulder PROM to WFL without complaints of pain at end ranges  -SP        Long Term Goals    LTG Date to Achieve 04/18/25  -SP     LTG 1 Patient demonstrates left shoulder AROM to WFL with normal GH scapula rhythm  -SP     LTG 2 Patient demonstrates left scapula stabilizer and shoulder strength to >4/5 throughout  -SP     LTG 3 Patient able to perform home and community ADLs with 0-1/10 pain or limitation  -SP     LTG 4 Patient indpendent with HEP  -SP        Time Calculation    PT Goal Re-Cert Due Date 04/18/25  -SP               User Key  (r) = Recorded By, (t) = Taken By, (c) = Cosigned By      Initials Name Provider Type    SP Litzy Quinn, PT Physical Therapist                     PT Assessment/Plan       Row Name 03/19/25 0811          PT Assessment    Functional Limitations Limitation in home management;Limitations in community activities;Performance in self-care ADL;Performance in leisure activities;Limitations in functional capacity and performance  -SP     Assessment Comments Patient with insidious onset of left shoulder pain and progressing ROM limitation since Dec 2024.  Patient diagnosed with adhesive capsulitis.  Patient presents with pain, decrease shoulder ROM, weakness and difficulty performing self care, home and community ADLs.  -SP     Please  refer to paper survey for additional self-reported information Yes  -SP     Rehab Potential Good  -SP     Patient/caregiver participated in establishment of treatment plan and goals Yes  -SP     Patient would benefit from skilled therapy intervention Yes  -SP        PT Plan    PT Frequency 1x/week;2x/week  -SP     Predicted Duration of Therapy Intervention (PT) 4-6 weeks  -SP     Planned CPT's? PT EVAL LOW COMPLEXITY: 19836;PT THER PROC EA 15 MIN: 76773;PT MANUAL THERAPY EA 15 MIN: 36996;PT HOT OR COLD PACK TREAT MCARE;PT ELECTRICAL STIM UNATTEND:   -SP               User Key  (r) = Recorded By, (t) = Taken By, (c) = Cosigned By      Initials Name Provider Type    Litzy Mendez, PT Physical Therapist                     Modalities       Row Name 03/19/25 0700             Moist Heat    MH Applied Yes  -SP      Location left shoulder: patient supine  -SP      PT Moist Heat Minutes 10  -SP      MH Prior to Rx Yes  -SP         Ice    Ice Applied Yes  -SP      Location left shoulder  -SP      PT Ice Rx Minutes 10  -SP      Ice S/P Rx Yes  -SP                User Key  (r) = Recorded By, (t) = Taken By, (c) = Cosigned By      Initials Name Provider Type    Litzy Mendez, PT Physical Therapist                   OP Exercises       Row Name 03/19/25 0800             Exercise 1    Exercise Name 1 AAROM shoulder fleixon: clasped hands, supine  -SP      Reps 1 10  -SP         Exercise 2    Exercise Name 2 AAROM shoulder flexion: cane  -SP      Reps 2 10  -SP         Exercise 3    Exercise Name 3 prone on elbows posterior capsule stretch  -SP      Time 3 3 min  -SP         Exercise 4    Exercise Name 4 Wall walk  -SP      Reps 4 5  -SP                User Key  (r) = Recorded By, (t) = Taken By, (c) = Cosigned By      Initials Name Provider Type    Litzy Mendez, PT Physical Therapist                  Manual Rx (Last 36 Hours)       Manual Treatments       Row Name 03/19/25 0700              Manual Rx 1    Manual Rx 1 Location left shoulder  -SP      Manual Rx 1 Type PROM into all planes with distraction oscillation:  10 x each  -SP      Manual Rx 1 Grade Gr I  -SP      Manual Rx 1 Duration 8 min  -SP         Manual Rx 2    Manual Rx 2 Location left shoulder  -SP      Manual Rx 2 Type GH joint mobilization: posterior glide and inferior glide 15 osc x 5 each  -SP                User Key  (r) = Recorded By, (t) = Taken By, (c) = Cosigned By      Initials Name Provider Type    Litzy Mendez, PT Physical Therapist                                Outcome Measure Options: Quick DASH  Quick DASH  Open a tight or new jar.: Moderate Difficulty  Do heavy household chores (e.g., wash walls, wash floors): Moderate Difficulty  Carry a shopping bag or briefcase: Mild Difficulty  Wash your back: Moderate Difficulty  Use a knife to cut food: No Difficulty  Recreational activities in which you take some force or impact through your arm, should or hand (e.g. golf, hammering, tennis, etc.): Severe Difficulty  During the past week, to what extent has your arm, shoulder, or hand problem interfered with your normal social activites with family, friends, neighbors or groups?: Slightly  During the past week, were you limited in your work or other regular daily activities as a result of your arm, shoulder or hand problem?: Moderately Limited  Arm, Shoulder, or hand pain: Severe  Tingling (pins and needles) in your arm, shoulder, or hand: None  During the past week, how much difficulty have you had sleeping because of the pain in your arm, shoulder or hand?: Moderate Difficiculty  Number of Questions Answered: 11  Quick DASH Score: 40.91  Work Module (Optional)  Using your usual technique for your work?: Moderate Difficulty  Doing your usual work because of arm, shoulder or hand pain?: Moderate Difficulty  Doing your work as well as you would like?: Mild Difficulty  Spending your usual amount of time doing  your work?: No Difficulty  Work Module Score: 31.25  Sports/Performing Arts Module (Optional)  Using your usual technique for playing your instrument or sport?: Mild Difficulty  Playing your musical instrument or sport because of arm, shoulder or hand pain?: Mild Difficulty  Playing your musical instrument or sport as well as you would like?: Mild Difficulty  Spending your usual amount of time practising or playing your instrument or sport?: Mild Difficulty  Sports/Performing Arts Score: 25         Time Calculation:     Start Time: 0700  Stop Time: 0800  Time Calculation (min): 60 min  Untimed Charges  PT Eval/Re-eval Minutes: 60  PT Moist Heat Minutes: 10  PT Ice Rx Minutes: 10  Total Minutes  Untimed Charges Total Minutes: 60   Total Minutes: 60     Therapy Charges for Today       Code Description Service Date Service Provider Modifiers Qty    42435618193 HC PT EVAL LOW COMPLEXITY 4 3/19/2025 Litzy Quinn, PT GP 1            PT G-Codes  Outcome Measure Options: Quick DASH  Quick DASH Score: 40.91         Litzy Quinn, PT  3/19/2025

## 2025-03-25 ENCOUNTER — HOSPITAL ENCOUNTER (OUTPATIENT)
Dept: PHYSICAL THERAPY | Facility: HOSPITAL | Age: 50
Setting detail: THERAPIES SERIES
Discharge: HOME | End: 2025-03-25
Payer: COMMERCIAL

## 2025-03-25 DIAGNOSIS — M75.02 ADHESIVE CAPSULITIS OF LEFT SHOULDER: ICD-10-CM

## 2025-03-25 DIAGNOSIS — M25.512 LEFT SHOULDER PAIN, UNSPECIFIED CHRONICITY: Primary | ICD-10-CM

## 2025-03-25 PROCEDURE — 97110 THERAPEUTIC EXERCISES: CPT | Performed by: PHYSICAL THERAPIST

## 2025-03-25 PROCEDURE — 97140 MANUAL THERAPY 1/> REGIONS: CPT | Performed by: PHYSICAL THERAPIST

## 2025-03-25 NOTE — THERAPY TREATMENT NOTE
Outpatient Physical Therapy Ortho Treatment Note  [facility]     Patient Name: Kendra Mendoza  : 1975  MRN: [id number]  Today's Date: 3/25/2025      Visit Date: 2025    Visit Dx:    ICD-10-CM ICD-9-CM   1. Left shoulder pain, unspecified chronicity  M25.512 719.41   2. Adhesive capsulitis of left shoulder  M75.02 726.0       Patient Active Problem List   Diagnosis    History of pituitary tumor    Family history of ovarian cancer    Hematuria        Past Medical History:   Diagnosis Date    Anxiety     Depression     Disease of thyroid gland     Female infertility     History of pituitary tumor     Hyperlipidemia     Hypertension 2020    Osteopenia 2010    Varicella 1978        Past Surgical History:   Procedure Laterality Date    HEMORRHOIDECTOMY      WISDOM TOOTH EXTRACTION          PT Ortho       Row Name 25 0900       Subjective    Subjective Comments Patient reports that she was sore after last visit, but has been doing exercises at home.  She does feel like her motion is better.  -SP              User Key  (r) = Recorded By, (t) = Taken By, (c) = Cosigned By      Initials Name Provider Type    Litzy Mendez, PT Physical Therapist                                 PT Assessment/Plan       Row Name 25 0937          PT Assessment    Assessment Comments Patient continues to have capsular tightness but better tolerance for manual therapy and passive stretch.  -SP        PT Plan    PT Plan Comments Continue to progress left shoulder ROM and strengthening  -SP               User Key  (r) = Recorded By, (t) = Taken By, (c) = Cosigned By      Initials Name Provider Type    Litzy Mendez, PT Physical Therapist                     Modalities       Row Name 25 0900             Moist Heat    MH Applied Yes  -SP      Location left shoulder: patient supine  -SP      PT Moist Heat Minutes 10  -SP      MH Prior to Rx Yes  -SP                User Key  (r) = Recorded  By, (t) = Taken By, (c) = Cosigned By      Initials Name Provider Type    Litzy Mendez PT Physical Therapist                   OP Exercises       Row Name 03/25/25 0940 03/25/25 0900          Subjective    Subjective Comments -- Patient reports that she was sore after last visit, but has been doing exercises at home.  She does feel like her motion is better.  -SP        Total Minutes    22731 - PT Therapeutic Exercise Minutes 15  -SP --     13152 - PT Manual Therapy Minutes 15  -SP --        Exercise 1    Exercise Name 1 -- AAROM shoulder fleixon: clasped hands, supine  -SP     Reps 1 -- 15  -SP        Exercise 2    Exercise Name 2 -- AAROM shoulder flexion: cane  -SP     Reps 2 -- 15  -SP        Exercise 3    Exercise Name 3 -- prone on elbows posterior capsule stretch  -SP     Time 3 -- 3 min  -SP        Exercise 4    Exercise Name 4 -- Wall walk  -SP     Reps 4 -- 10  -SP        Exercise 5    Exercise Name 5 -- sleeper stretch  -SP     Reps 5 -- 5  -SP     Time 5 -- 15 sec  -SP        Exercise 6    Exercise Name 6 -- pulleys; flex  -SP     Reps 6 -- 5 min  -SP               User Key  (r) = Recorded By, (t) = Taken By, (c) = Cosigned By      Initials Name Provider Type    Litzy Mendez, PT Physical Therapist                             Manual Rx (Last 36 Hours)       Manual Treatments       Row Name 03/25/25 0940 03/25/25 0900          Total Minutes    69255 - PT Manual Therapy Minutes 15  -SP --        Manual Rx 1    Manual Rx 1 Location -- left shoulder  -SP     Manual Rx 1 Type -- PROM into all planes with distraction oscillation:  10 x each  -SP     Manual Rx 1 Grade -- Gr I  -SP     Manual Rx 1 Duration -- 8 min  -SP        Manual Rx 2    Manual Rx 2 Location -- left shoulder  -SP     Manual Rx 2 Type -- GH joint mobilization: posterior glide and inferior glide 15 osc x 5 each  -SP               User Key  (r) = Recorded By, (t) = Taken By, (c) = Cosigned By      Initials Name  Provider Type    SP Litzy Quinn, PT Physical Therapist                        Therapy Education  Given: HEP  Program: Reinforced, Progressed  How Provided: Verbal, Written  Provided to: Patient  Level of Understanding: Verbalized, Demonstrated              Time Calculation:   Start Time: 0755  Stop Time: 0900  Time Calculation (min): 65 min  Timed Charges  39730 - PT Therapeutic Exercise Minutes: 15  91728 - PT Manual Therapy Minutes: 15  Untimed Charges  PT Moist Heat Minutes: 10  Total Minutes  Timed Charges Total Minutes: 30  Untimed Charges Total Minutes: 10   Total Minutes: 30  Therapy Charges for Today       Code Description Service Date Service Provider Modifiers Qty    61571561852 HC PT THER PROC EA 15 MIN 3/25/2025 Litzy Quinn, PT GP 1    77049929121 HC PT MANUAL THERAPY EA 15 MIN 3/25/2025 Litzy Quinn, PT GP 1                      Litzy Quinn, PT  3/25/2025

## 2025-04-01 ENCOUNTER — HOSPITAL ENCOUNTER (OUTPATIENT)
Dept: PHYSICAL THERAPY | Facility: HOSPITAL | Age: 50
Setting detail: THERAPIES SERIES
Discharge: HOME | End: 2025-04-01
Payer: COMMERCIAL

## 2025-04-01 DIAGNOSIS — M75.02 ADHESIVE CAPSULITIS OF LEFT SHOULDER: ICD-10-CM

## 2025-04-01 DIAGNOSIS — M25.512 LEFT SHOULDER PAIN, UNSPECIFIED CHRONICITY: Primary | ICD-10-CM

## 2025-04-01 PROCEDURE — 97110 THERAPEUTIC EXERCISES: CPT | Performed by: PHYSICAL THERAPIST

## 2025-04-01 PROCEDURE — 97140 MANUAL THERAPY 1/> REGIONS: CPT | Performed by: PHYSICAL THERAPIST

## 2025-04-01 NOTE — THERAPY TREATMENT NOTE
Outpatient Physical Therapy Ortho Treatment Note  [facility]     Patient Name: Kendra Mendoza  : 1975  MRN: [id number]  Today's Date: 2025      Visit Date: 2025    Visit Dx:    ICD-10-CM ICD-9-CM   1. Left shoulder pain, unspecified chronicity  M25.512 719.41   2. Adhesive capsulitis of left shoulder  M75.02 726.0       Patient Active Problem List   Diagnosis    History of pituitary tumor    Family history of ovarian cancer    Hematuria        Past Medical History:   Diagnosis Date    Anxiety     Depression     Disease of thyroid gland     Female infertility     History of pituitary tumor     Hyperlipidemia     Hypertension 2020    Osteopenia     Varicella         Past Surgical History:   Procedure Laterality Date    HEMORRHOIDECTOMY      WISDOM TOOTH EXTRACTION          PT Ortho       Row Name 25 1000       Subjective    Subjective Comments Patient reports that she continues to notice overall improvement in mobility, however, she still has pain and limitation  -SP              User Key  (r) = Recorded By, (t) = Taken By, (c) = Cosigned By      Initials Name Provider Type    Litzy Mendez, PT Physical Therapist                                 PT Assessment/Plan       Row Name 25 1020          PT Assessment    Assessment Comments Patient with difficulty tolerating passive techniques but demonstrates improved left shoulder mobility following treatment  -SP        PT Plan    PT Plan Comments Continue to progress left shoulder ROM and strengthening  -SP               User Key  (r) = Recorded By, (t) = Taken By, (c) = Cosigned By      Initials Name Provider Type    Litzy Mendez, PT Physical Therapist                     Modalities       Row Name 25 1000             Moist Heat    MH Applied Yes  -SP      Location left shoulder: patient supine  -SP      PT Moist Heat Minutes 10  -SP      MH Prior to Rx Yes  -SP                User Key  (r) =  Recorded By, (t) = Taken By, (c) = Cosigned By      Initials Name Provider Type    Litzy Mendez PT Physical Therapist                   OP Exercises       Row Name 04/01/25 1021 04/01/25 1000          Subjective    Subjective Comments -- Patient reports that she continues to notice overall improvement in mobility, however, she still has pain and limitation  -SP        Total Minutes    15304 - PT Therapeutic Exercise Minutes 15  -SP --     91105 - PT Manual Therapy Minutes 20  -SP --        Exercise 1    Exercise Name 1 -- AAROM shoulder fleixon: clasped hands, supine  -SP     Reps 1 -- 15  -SP        Exercise 2    Exercise Name 2 -- AAROM shoulder flexion: cane  -SP     Reps 2 -- 15  -SP        Exercise 3    Exercise Name 3 -- prone on elbows posterior capsule stretch  -SP     Time 3 -- 3 min  -SP        Exercise 4    Exercise Name 4 -- Wall walk  -SP     Reps 4 -- 10  -SP        Exercise 5    Exercise Name 5 -- sleeper stretch  -SP     Reps 5 -- 5  -SP     Time 5 -- 15 sec  -SP        Exercise 6    Exercise Name 6 -- pulleys; flex  -SP     Reps 6 -- 5 min  -SP        Exercise 7    Exercise Name 7 -- tband rows  -SP     Reps 7 -- 15  -SP     Time 7 -- LF red  -SP               User Key  (r) = Recorded By, (t) = Taken By, (c) = Cosigned By      Initials Name Provider Type    Litzy Mendez, PT Physical Therapist                             Manual Rx (Last 36 Hours)       Manual Treatments       Row Name 04/01/25 1021 04/01/25 0900          Total Minutes    03764 - PT Manual Therapy Minutes 20  -SP --        Manual Rx 1    Manual Rx 1 Location -- left shoulder  -SP     Manual Rx 1 Type -- PROM into all planes with distraction oscillation:  10 x each  -SP     Manual Rx 1 Grade -- Gr I  -SP     Manual Rx 1 Duration -- 8 min  -SP        Manual Rx 2    Manual Rx 2 Location -- left shoulder  -SP     Manual Rx 2 Type -- GH joint mobilization: posterior glide and inferior glide 15 osc x 5 each   -SP               User Key  (r) = Recorded By, (t) = Taken By, (c) = Cosigned By      Initials Name Provider Type    SP Litzy Quinn, PT Physical Therapist                        Therapy Education  Given: HEP  Program: Reinforced, Progressed  How Provided: Verbal, Written  Provided to: Patient  Level of Understanding: Verbalized, Demonstrated              Time Calculation:   Start Time: 0855  Stop Time: 1005  Time Calculation (min): 70 min  Timed Charges  29924 - PT Therapeutic Exercise Minutes: 15  26309 - PT Manual Therapy Minutes: 20  Untimed Charges  PT Moist Heat Minutes: 10  Total Minutes  Timed Charges Total Minutes: 35  Untimed Charges Total Minutes: 10   Total Minutes: 35  Therapy Charges for Today       Code Description Service Date Service Provider Modifiers Qty    20293719235 HC PT THER PROC EA 15 MIN 4/1/2025 Litzy Quinn, PT GP 1    80807777621 HC PT MANUAL THERAPY EA 15 MIN 4/1/2025 Litzy Quinn, PT GP 1                      Litzy Quinn PT  4/1/2025

## 2025-04-07 ENCOUNTER — OFFICE VISIT (OUTPATIENT)
Dept: OBSTETRICS AND GYNECOLOGY | Facility: CLINIC | Age: 50
End: 2025-04-07
Payer: COMMERCIAL

## 2025-04-07 VITALS
BODY MASS INDEX: 21.86 KG/M2 | WEIGHT: 136 LBS | HEIGHT: 66 IN | SYSTOLIC BLOOD PRESSURE: 146 MMHG | DIASTOLIC BLOOD PRESSURE: 84 MMHG

## 2025-04-07 DIAGNOSIS — N83.202 CYST OF LEFT OVARY: ICD-10-CM

## 2025-04-07 DIAGNOSIS — N93.9 ABNORMAL UTERINE BLEEDING (AUB): ICD-10-CM

## 2025-04-07 DIAGNOSIS — Z13.89 SCREENING FOR GENITOURINARY CONDITION: Primary | ICD-10-CM

## 2025-04-07 DIAGNOSIS — Z79.890 HORMONE REPLACEMENT THERAPY (HRT): ICD-10-CM

## 2025-04-07 LAB
BILIRUB BLD-MCNC: NEGATIVE MG/DL
CLARITY, POC: CLEAR
COLOR UR: YELLOW
GLUCOSE UR STRIP-MCNC: NEGATIVE MG/DL
KETONES UR QL: ABNORMAL
LEUKOCYTE EST, POC: NEGATIVE
NITRITE UR-MCNC: NEGATIVE MG/ML
PH UR: 5 [PH] (ref 5–8)
PROT UR STRIP-MCNC: NEGATIVE MG/DL
RBC # UR STRIP: ABNORMAL /UL
SP GR UR: 1 (ref 1–1.03)
UROBILINOGEN UR QL: NORMAL

## 2025-04-07 RX ORDER — PROGESTERONE 100 MG/1
CAPSULE ORAL
Qty: 180 CAPSULE | Refills: 1 | Status: SHIPPED | OUTPATIENT
Start: 2025-04-07

## 2025-04-07 NOTE — PROGRESS NOTES
"      Kendra Mendoza is a 49 y.o. patient who presents for follow up of   Chief Complaint   Patient presents with    Vaginal Bleeding       50 yo est pt here for f/u AUB. She stopped OCPs at 49 and did not have a cycle but was having hot flashes and night sweats and was started on HRT.  She is on Prometrium 100 mg as well as Vivelle-Dot 0.0375 mg twice a week.  Her labs are in the menopausal range.  She is also on Lexapro 5 mg for anxiety and depression.  She started having bleeding like a period that lasted for about 2 weeks.  No significant pain.  She has been very stressed recently her  had an MI and had to have a stop flight.  He is doing better now.  Her ultrasound today shows a 6.2 cm AV uterus.  EL is 0.36 cm.  Her right ovary is normal.  Her left ovary shows an anechoic area measuring 3.7 x 3.1 cm.  There is no comparable data.  We discussed increasing her Prometrium to 200 mg every day.  We will keep her Vivelle-Dot at the same dose.  We will see her back in 2 months and repeat her ultrasound.  Given her thin endometrial lining, do not feel endometrial sampling is indicated at this time.  We did discuss that she needs a mammogram ASAP.      The following portions of the patient's history were reviewed and updated as appropriate: allergies, current medications and problem list.    Review of Systems   Constitutional:  Positive for activity change and unexpected weight change.   Endocrine: Negative for cold intolerance and heat intolerance.   Genitourinary:  Positive for menstrual problem and vaginal bleeding. Negative for pelvic pain.   Psychiatric/Behavioral:  Positive for dysphoric mood (stress). Negative for sleep disturbance. The patient is not nervous/anxious.        /84   Ht 167.6 cm (66\")   Wt 61.7 kg (136 lb)   LMP  (LMP Unknown) Comment: years ago last cycle  Breastfeeding No   BMI 21.95 kg/m²     Physical Exam  Vitals and nursing note reviewed.   Constitutional:       Appearance: She " is well-developed.   HENT:      Head: Normocephalic and atraumatic.   Eyes:      General: No scleral icterus.     Conjunctiva/sclera: Conjunctivae normal.   Neck:      Thyroid: No thyromegaly.   Abdominal:      General: There is no distension.      Palpations: Abdomen is soft. There is no mass.      Tenderness: There is no abdominal tenderness. There is no guarding or rebound.      Hernia: No hernia is present.   Neurological:      Mental Status: She is alert and oriented to person, place, and time.   Psychiatric:         Mood and Affect: Mood normal.         Behavior: Behavior normal.         Thought Content: Thought content normal.         Judgment: Judgment normal.         A/P:  1. AUB- doubt this is true  VB as she has been on some ovarian suppression for years. EL= 0.36 cm.   2. HRT- will increase Prometrium to 200 mg QHS and continue with Vivelle dot 0.0375 mg x 2/week  3. L ovarian cyst- pt asymptomatic. RTO in 2 months recheck US and VB patterns  4. RHM_- annual UTD 10/2024  5. MMG UTD 10/2023- B1. Enc pt to get MMG ASAP  6. .RTO in 2 months recheck US and VB patterns    Assessment & Plan   Diagnoses and all orders for this visit:    1. Screening for genitourinary condition (Primary)  -     POC Urinalysis Dipstick    2. Abnormal uterine bleeding (AUB)    3. Cyst of left ovary    4. Hormone replacement therapy (HRT)    Other orders  -     Progesterone (PROMETRIUM) 100 MG capsule; Take two tablets by mouth at night  Dispense: 180 capsule; Refill: 1                 No follow-ups on file.      Kimberly Harrington MD    4/7/2025  09:44 EDT

## 2025-04-08 ENCOUNTER — HOSPITAL ENCOUNTER (OUTPATIENT)
Dept: PHYSICAL THERAPY | Facility: HOSPITAL | Age: 50
Setting detail: THERAPIES SERIES
Discharge: HOME | End: 2025-04-08
Payer: COMMERCIAL

## 2025-04-08 DIAGNOSIS — M25.512 LEFT SHOULDER PAIN, UNSPECIFIED CHRONICITY: Primary | ICD-10-CM

## 2025-04-08 DIAGNOSIS — M75.02 ADHESIVE CAPSULITIS OF LEFT SHOULDER: ICD-10-CM

## 2025-04-08 PROCEDURE — 97110 THERAPEUTIC EXERCISES: CPT | Performed by: PHYSICAL THERAPIST

## 2025-04-08 PROCEDURE — 97140 MANUAL THERAPY 1/> REGIONS: CPT | Performed by: PHYSICAL THERAPIST

## 2025-04-08 NOTE — THERAPY TREATMENT NOTE
Outpatient Physical Therapy Ortho Treatment Note  [facility]     Patient Name: Kendra Mendoza  : 1975  MRN: [id number]  Today's Date: 2025      Visit Date: 2025    Visit Dx:    ICD-10-CM ICD-9-CM   1. Left shoulder pain, unspecified chronicity  M25.512 719.41   2. Adhesive capsulitis of left shoulder  M75.02 726.0       Patient Active Problem List   Diagnosis    History of pituitary tumor    Family history of ovarian cancer    Hematuria        Past Medical History:   Diagnosis Date    Anxiety     Depression     Disease of thyroid gland     Female infertility     History of pituitary tumor     Hyperlipidemia     Hypertension 2020    Osteopenia     Varicella         Past Surgical History:   Procedure Laterality Date    HEMORRHOIDECTOMY      WISDOM TOOTH EXTRACTION          PT Ortho       Row Name 25 1100       Subjective    Subjective Comments Patient reports that she is noticing gradual improvement in left shoulder ROM but it is still not equal to her right side  -SP              User Key  (r) = Recorded By, (t) = Taken By, (c) = Cosigned By      Initials Name Provider Type    Litzy Mendez, PT Physical Therapist                                 PT Assessment/Plan       Row Name 25 1153          PT Assessment    Assessment Comments Patient continues to demonstrate gradually improving left shoulder ROM  -SP        PT Plan    PT Plan Comments Continue to progress as tolerable  -SP               User Key  (r) = Recorded By, (t) = Taken By, (c) = Cosigned By      Initials Name Provider Type    Litzy Mendez, PT Physical Therapist                     Modalities       Row Name 25 1100             Moist Heat    MH Applied Yes  -SP      Location left shoulder: patient supine  -SP      PT Moist Heat Minutes 10  -SP      MH Prior to Rx Yes  -SP                User Key  (r) = Recorded By, (t) = Taken By, (c) = Cosigned By      Initials Name Provider Type     Litzy Mendez, PT Physical Therapist                   OP Exercises       Row Name 04/08/25 1153 04/08/25 1100          Subjective    Subjective Comments -- Patient reports that she is noticing gradual improvement in left shoulder ROM but it is still not equal to her right side  -SP        Total Minutes    55119 - PT Therapeutic Exercise Minutes 15  -SP --     31322 - PT Manual Therapy Minutes 15  -SP --        Exercise 1    Exercise Name 1 -- AAROM shoulder fleixon: clasped hands, supine  -SP     Reps 1 -- 15  -SP        Exercise 2    Exercise Name 2 -- AAROM shoulder flexion: cane  -SP     Reps 2 -- 15  -SP        Exercise 3    Exercise Name 3 -- prone on elbows posterior capsule stretch  -SP     Time 3 -- 3 min  -SP        Exercise 4    Exercise Name 4 -- Wall walk  -SP     Reps 4 -- 10  -SP        Exercise 5    Exercise Name 5 -- sleeper stretch  -SP     Reps 5 -- 5  -SP     Time 5 -- 15 sec  -SP        Exercise 6    Exercise Name 6 -- pulleys; flex  -SP     Reps 6 -- 5 min  -SP        Exercise 7    Exercise Name 7 -- tband rows  -SP     Reps 7 -- 15  -SP     Time 7 -- LF red  -SP        Exercise 8    Exercise Name 8 -- pec stretch on foam roll  -SP     Time 8 -- 4 min  -SP        Exercise 9    Exercise Name 9 -- door way stretch  -SP     Reps 9 -- 3  -SP     Time 9 -- 15 sec  -SP               User Key  (r) = Recorded By, (t) = Taken By, (c) = Cosigned By      Initials Name Provider Type    Litzy Mendez, PT Physical Therapist                             Manual Rx (Last 36 Hours)       Manual Treatments       Row Name 04/08/25 1153 04/08/25 1100          Total Minutes    03336 - PT Manual Therapy Minutes 15  -SP --        Manual Rx 1    Manual Rx 1 Location -- left shoulder  -SP     Manual Rx 1 Type -- PROM into all planes with distraction oscillation:  10 x each  -SP     Manual Rx 1 Grade -- Gr I  -SP     Manual Rx 1 Duration -- 8 min  -SP        Manual Rx 2    Manual Rx 2  Location -- left shoulder  -SP     Manual Rx 2 Type -- GH joint mobilization: posterior glide and inferior glide 15 osc x 5 each  -SP               User Key  (r) = Recorded By, (t) = Taken By, (c) = Cosigned By      Initials Name Provider Type    Litzy Mendez, PT Physical Therapist                        Therapy Education  Given: HEP  Program: Reinforced, Progressed  How Provided: Verbal, Written  Provided to: Patient  Level of Understanding: Verbalized, Demonstrated              Time Calculation:   Start Time: 1000  Stop Time: 1110  Time Calculation (min): 70 min  Timed Charges  33564 - PT Therapeutic Exercise Minutes: 15  48649 - PT Manual Therapy Minutes: 15  Untimed Charges  PT Moist Heat Minutes: 10  Total Minutes  Timed Charges Total Minutes: 30  Untimed Charges Total Minutes: 10   Total Minutes: 30  Therapy Charges for Today       Code Description Service Date Service Provider Modifiers Qty    21673854619 HC PT THER PROC EA 15 MIN 4/8/2025 Litzy Quinn, PT GP 1    96781694262 HC PT MANUAL THERAPY EA 15 MIN 4/8/2025 Litzy Quinn, PT GP 1                      Litzy Quinn, PT  4/8/2025

## 2025-04-16 ENCOUNTER — HOSPITAL ENCOUNTER (OUTPATIENT)
Dept: PHYSICAL THERAPY | Facility: HOSPITAL | Age: 50
Setting detail: THERAPIES SERIES
Discharge: HOME | End: 2025-04-16
Payer: COMMERCIAL

## 2025-04-16 DIAGNOSIS — M25.512 LEFT SHOULDER PAIN, UNSPECIFIED CHRONICITY: Primary | ICD-10-CM

## 2025-04-16 DIAGNOSIS — M75.02 ADHESIVE CAPSULITIS OF LEFT SHOULDER: ICD-10-CM

## 2025-04-16 PROCEDURE — 97161 PT EVAL LOW COMPLEX 20 MIN: CPT | Performed by: PHYSICAL THERAPIST

## 2025-04-16 NOTE — THERAPY TREATMENT NOTE
Outpatient Physical Therapy Ortho Treatment Note  [facility]     Patient Name: Kendra Mendoza  : 1975  MRN: [id number]  Today's Date: 2025      Visit Date: 2025    Visit Dx:    ICD-10-CM ICD-9-CM   1. Left shoulder pain, unspecified chronicity  M25.512 719.41   2. Adhesive capsulitis of left shoulder  M75.02 726.0       Patient Active Problem List   Diagnosis    History of pituitary tumor    Family history of ovarian cancer    Hematuria        Past Medical History:   Diagnosis Date    Anxiety     Depression     Disease of thyroid gland     Female infertility     History of pituitary tumor     Hyperlipidemia     Hypertension 2020    Osteopenia     Varicella         Past Surgical History:   Procedure Laterality Date    HEMORRHOIDECTOMY      WISDOM TOOTH EXTRACTION          PT Ortho       Row Name 25 0800       Subjective    Subjective Comments Patient reports that she is noticing very gradual change/ improvement in her mobility  -SP              User Key  (r) = Recorded By, (t) = Taken By, (c) = Cosigned By      Initials Name Provider Type    Litzy Mendez, PT Physical Therapist                                 PT Assessment/Plan       Row Name 25 0955          PT Assessment    Assessment Comments Patient with gradually progressing left shoulder ROM.  She demonstrates excellent compliance with exercise  -SP        PT Plan    PT Plan Comments Continue to progress as tolerable.   Measures ROM next visit  -SP               User Key  (r) = Recorded By, (t) = Taken By, (c) = Cosigned By      Initials Name Provider Type    Litzy Mendez, PT Physical Therapist                     Modalities       Row Name 25 0800             Moist Heat    MH Applied Yes  -SP      Location left shoulder: patient supine  -SP      PT Moist Heat Minutes 10  -SP      MH Prior to Rx Yes  -SP                User Key  (r) = Recorded By, (t) = Taken By, (c) = Cosigned By       Initials Name Provider Type    Litzy Mendez, PT Physical Therapist                   OP Exercises       Row Name 04/16/25 0800             Subjective    Subjective Comments Patient reports that she is noticing very gradual change/ improvement in her mobility  -SP         Exercise 1    Exercise Name 1 AAROM shoulder fleixon: clasped hands, supine  -SP      Reps 1 15  -SP         Exercise 2    Exercise Name 2 AAROM shoulder flexion: cane  -SP      Reps 2 15  -SP         Exercise 3    Exercise Name 3 prone on elbows posterior capsule stretch  -SP      Time 3 3 min  -SP         Exercise 4    Exercise Name 4 Wall walk  -SP      Reps 4 10  -SP         Exercise 5    Exercise Name 5 sleeper stretch  -SP      Reps 5 5  -SP      Time 5 15 sec  -SP         Exercise 6    Exercise Name 6 pulleys; flex  -SP      Reps 6 5 min  -SP         Exercise 7    Exercise Name 7 tband rows  -SP      Reps 7 20  -SP      Time 7 LF red  -SP         Exercise 8    Exercise Name 8 pec stretch on foam roll  -SP      Time 8 4 min  -SP         Exercise 9    Exercise Name 9 door way stretch  -SP      Reps 9 3  -SP      Time 9 15 sec  -SP         Exercise 10    Exercise Name 10 tband shoulder extension with scapula retraction  -SP      Reps 10 15  -SP      Time 10 LF red  -SP                User Key  (r) = Recorded By, (t) = Taken By, (c) = Cosigned By      Initials Name Provider Type    Litzy Mendez, AYALA Physical Therapist                             Manual Rx (Last 36 Hours)       Manual Treatments       Row Name 04/16/25 0900             Manual Rx 1    Manual Rx 1 Location left shoulder  -SP      Manual Rx 1 Type PROM into all planes with distraction oscillation:  10 x each  -SP      Manual Rx 1 Grade Gr I  -SP      Manual Rx 1 Duration 8 min  -SP         Manual Rx 2    Manual Rx 2 Location left shoulder  -SP      Manual Rx 2 Type GH joint mobilization: posterior glide and inferior glide 15 osc x 5 each  -SP                 User Key  (r) = Recorded By, (t) = Taken By, (c) = Cosigned By      Initials Name Provider Type    SP Ltizy Quinn, PT Physical Therapist                        Therapy Education  Given: HEP  Program: Reinforced  How Provided: Verbal  Provided to: Patient  Level of Understanding: Verbalized, Demonstrated              Time Calculation:   Start Time: 0800  Stop Time: 0900  Time Calculation (min): 60 min  Untimed Charges  PT Eval/Re-eval Minutes: 60  PT Moist Heat Minutes: 10  Total Minutes  Untimed Charges Total Minutes: 60   Total Minutes: 60  Therapy Charges for Today       Code Description Service Date Service Provider Modifiers Qty    24546223015 HC PT EVAL LOW COMPLEXITY 4 4/16/2025 Litzy Quinn, PT GP 1                      Litzy Quinn, PT  4/16/2025

## 2025-04-28 RX ORDER — PROGESTERONE 100 MG/1
CAPSULE ORAL
Qty: 180 CAPSULE | Refills: 1 | Status: SHIPPED | OUTPATIENT
Start: 2025-04-28

## 2025-05-07 RX ORDER — ESTRADIOL 0.04 MG/D
1 PATCH, EXTENDED RELEASE TRANSDERMAL 2 TIMES WEEKLY
Qty: 24 PATCH | Refills: 1 | Status: SHIPPED | OUTPATIENT
Start: 2025-05-08 | End: 2025-05-07 | Stop reason: SDUPTHER

## 2025-05-07 RX ORDER — ESTRADIOL 0.04 MG/D
1 PATCH, EXTENDED RELEASE TRANSDERMAL 2 TIMES WEEKLY
Qty: 8 PATCH | Refills: 1 | Status: SHIPPED | OUTPATIENT
Start: 2025-05-08

## 2025-05-22 ENCOUNTER — OFFICE VISIT (OUTPATIENT)
Dept: OBSTETRICS AND GYNECOLOGY | Facility: CLINIC | Age: 50
End: 2025-05-22
Payer: COMMERCIAL

## 2025-05-22 VITALS
HEIGHT: 66 IN | SYSTOLIC BLOOD PRESSURE: 120 MMHG | DIASTOLIC BLOOD PRESSURE: 78 MMHG | BODY MASS INDEX: 21.86 KG/M2 | WEIGHT: 136 LBS

## 2025-05-22 DIAGNOSIS — Z79.890 HORMONE REPLACEMENT THERAPY (HRT): ICD-10-CM

## 2025-05-22 DIAGNOSIS — R53.83 OTHER FATIGUE: Primary | ICD-10-CM

## 2025-05-22 DIAGNOSIS — Z13.89 SCREENING FOR GENITOURINARY CONDITION: ICD-10-CM

## 2025-05-22 DIAGNOSIS — N93.9 ABNORMAL UTERINE BLEEDING (AUB): ICD-10-CM

## 2025-05-22 DIAGNOSIS — N83.202 CYST OF LEFT OVARY: ICD-10-CM

## 2025-05-22 LAB
25(OH)D3+25(OH)D2 SERPL-MCNC: 55.2 NG/ML (ref 30–100)
ALBUMIN SERPL-MCNC: 4.3 G/DL (ref 3.5–5.2)
ALBUMIN/GLOB SERPL: 1.8 G/DL
ALP SERPL-CCNC: 73 U/L (ref 39–117)
ALT SERPL-CCNC: 31 U/L (ref 1–33)
AST SERPL-CCNC: 27 U/L (ref 1–32)
BASOPHILS # BLD AUTO: 0.04 10*3/MM3 (ref 0–0.2)
BASOPHILS NFR BLD AUTO: 0.7 % (ref 0–1.5)
BILIRUB BLD-MCNC: NEGATIVE MG/DL
BILIRUB SERPL-MCNC: 0.3 MG/DL (ref 0–1.2)
BUN SERPL-MCNC: 15 MG/DL (ref 6–20)
BUN/CREAT SERPL: 17.2 (ref 7–25)
CALCIUM SERPL-MCNC: 9.9 MG/DL (ref 8.6–10.5)
CHLORIDE SERPL-SCNC: 105 MMOL/L (ref 98–107)
CLARITY, POC: CLEAR
CO2 SERPL-SCNC: 27.3 MMOL/L (ref 22–29)
COLOR UR: NORMAL
CREAT SERPL-MCNC: 0.87 MG/DL (ref 0.57–1)
EGFRCR SERPLBLD CKD-EPI 2021: 80.8 ML/MIN/1.73
EOSINOPHIL # BLD AUTO: 0.26 10*3/MM3 (ref 0–0.4)
EOSINOPHIL NFR BLD AUTO: 4.8 % (ref 0.3–6.2)
ERYTHROCYTE [DISTWIDTH] IN BLOOD BY AUTOMATED COUNT: 12.6 % (ref 12.3–15.4)
FERRITIN SERPL-MCNC: 53.5 NG/ML (ref 13–150)
GLOBULIN SER CALC-MCNC: 2.4 GM/DL
GLUCOSE SERPL-MCNC: 62 MG/DL (ref 65–99)
GLUCOSE UR STRIP-MCNC: NEGATIVE MG/DL
HCT VFR BLD AUTO: 42.5 % (ref 34–46.6)
HGB BLD-MCNC: 14.8 G/DL (ref 12–15.9)
IMM GRANULOCYTES # BLD AUTO: 0.02 10*3/MM3 (ref 0–0.05)
IMM GRANULOCYTES NFR BLD AUTO: 0.4 % (ref 0–0.5)
KETONES UR QL: NEGATIVE
LEUKOCYTE EST, POC: NEGATIVE
LYMPHOCYTES # BLD AUTO: 1.31 10*3/MM3 (ref 0.7–3.1)
LYMPHOCYTES NFR BLD AUTO: 24.1 % (ref 19.6–45.3)
MCH RBC QN AUTO: 31.8 PG (ref 26.6–33)
MCHC RBC AUTO-ENTMCNC: 34.8 G/DL (ref 31.5–35.7)
MCV RBC AUTO: 91.4 FL (ref 79–97)
MONOCYTES # BLD AUTO: 0.42 10*3/MM3 (ref 0.1–0.9)
MONOCYTES NFR BLD AUTO: 7.7 % (ref 5–12)
NEUTROPHILS # BLD AUTO: 3.38 10*3/MM3 (ref 1.7–7)
NEUTROPHILS NFR BLD AUTO: 62.3 % (ref 42.7–76)
NITRITE UR-MCNC: NEGATIVE MG/ML
NRBC BLD AUTO-RTO: 0 /100 WBC (ref 0–0.2)
PH UR: 5 [PH] (ref 5–8)
PLATELET # BLD AUTO: 383 10*3/MM3 (ref 140–450)
POTASSIUM SERPL-SCNC: 4.2 MMOL/L (ref 3.5–5.2)
PROT SERPL-MCNC: 6.7 G/DL (ref 6–8.5)
PROT UR STRIP-MCNC: NEGATIVE MG/DL
RBC # BLD AUTO: 4.65 10*6/MM3 (ref 3.77–5.28)
RBC # UR STRIP: NEGATIVE /UL
SODIUM SERPL-SCNC: 142 MMOL/L (ref 136–145)
SP GR UR: 1.02 (ref 1–1.03)
TSH SERPL DL<=0.005 MIU/L-ACNC: 1.13 UIU/ML (ref 0.27–4.2)
UROBILINOGEN UR QL: NORMAL
VIT B12 SERPL-MCNC: 925 PG/ML (ref 211–946)
WBC # BLD AUTO: 5.43 10*3/MM3 (ref 3.4–10.8)

## 2025-05-22 RX ORDER — PROGESTERONE 200 MG/1
CAPSULE ORAL
COMMUNITY
Start: 2025-05-01

## 2025-05-22 RX ORDER — BENZONATATE 200 MG/1
CAPSULE ORAL
COMMUNITY
Start: 2025-05-17

## 2025-05-22 RX ORDER — DEXTROMETHORPHAN HYDROBROMIDE AND PROMETHAZINE HYDROCHLORIDE 15; 6.25 MG/5ML; MG/5ML
SYRUP ORAL
COMMUNITY
Start: 2025-05-19

## 2025-05-22 RX ORDER — CEFDINIR 300 MG/1
CAPSULE ORAL
COMMUNITY
Start: 2025-05-19

## 2025-05-22 RX ORDER — TRIAZOLAM 0.25 MG
TABLET ORAL
COMMUNITY
Start: 2025-05-06

## 2025-05-22 NOTE — PROGRESS NOTES
"      Kendra Mendoza is a 49 y.o. patient who presents for follow up of   Chief Complaint   Patient presents with    Follow-up     U/S       49-year-old established patient here for ultrasound and emergency appointment for vaginal bleeding.  She stopped OCPs at 49 and did not really have cycles but had significant menopausal symptoms and was started on HRT.  She is on Prometrium 200 mg nightly as well as Vivelle-Dot 0.0375 mg.  She is on Lexapro 5 mg for anxiety and depression.  She had approximately 3 weeks of significant vaginal bleeding.  This is now stopped.  She does feel fatigued.  Her HRT has helped with her menopausal symptoms and she is getting better sleep and overall felt better until her bleeding episode.  Her ultrasound today shows a 7 cm RV uterus.  EL 0.67 cm.  The right ovary is normal.  The left ovary has a simple cyst that measures 3.8 x 3.5 cm.  This ultrasound was compared to her last scan on 4/7/2025.  We discussed that her lining is not significantly thickened, however, if she continues to have abnormal bleeding or if her lining thickens at her follow-up ultrasound she will need endometrial sampling.  She also has short-term follow-up next month of this left ovarian cyst.  She is advised to call for any pain or increase in bleeding.      The following portions of the patient's history were reviewed and updated as appropriate: allergies, current medications and problem list.    Review of Systems   Constitutional:  Positive for activity change and fatigue.   Endocrine: Positive for heat intolerance.   Genitourinary:  Positive for menstrual problem and vaginal bleeding.       /78   Ht 167.6 cm (66\")   Wt 61.7 kg (136 lb)   LMP  (LMP Unknown) Comment: years ago last cycle  BMI 21.95 kg/m²     Physical Exam  Vitals and nursing note reviewed.   Constitutional:       Appearance: She is well-developed.   HENT:      Head: Normocephalic and atraumatic.   Eyes:      General: No scleral icterus.     " Conjunctiva/sclera: Conjunctivae normal.   Neck:      Thyroid: No thyromegaly.   Abdominal:      General: There is no distension.      Palpations: Abdomen is soft. There is no mass.      Tenderness: There is no abdominal tenderness. There is no guarding or rebound.      Hernia: No hernia is present.   Skin:     General: Skin is warm and dry.   Neurological:      Mental Status: She is alert and oriented to person, place, and time.   Psychiatric:         Behavior: Behavior normal.         Thought Content: Thought content normal.         Judgment: Judgment normal.         A/P:  1. AUB- check CBC, ferritin  2. Fatigue- check CMP, vitD, vit B12  3. L ovarian cyst- stable to slightly larger in size. Appears simple and pt is asymptomatic. Repeat US and visit in 1 month  4. HRT- pt with improved symptom control on Vivelle-Dot and Prometrium.  5. MMG scheduled for 6/3/2025  6. RTO in 1 month for US and visit.    Assessment & Plan   Diagnoses and all orders for this visit:    1. Other fatigue (Primary)  -     CBC & Differential  -     TSH Rfx On Abnormal To Free T4  -     Ferritin  -     Comprehensive Metabolic Panel  -     Vitamin B12  -     Vitamin D,25-Hydroxy    2. Screening for genitourinary condition  -     POC Urinalysis Dipstick    3. Abnormal uterine bleeding (AUB)    4. Hormone replacement therapy (HRT)    5. Cyst of left ovary                 No follow-ups on file.      Kimberly Harrington MD    5/22/2025  12:53 EDT

## 2025-05-30 ENCOUNTER — TELEPHONE (OUTPATIENT)
Dept: OBSTETRICS AND GYNECOLOGY | Facility: CLINIC | Age: 50
End: 2025-05-30
Payer: COMMERCIAL

## 2025-05-30 RX ORDER — MEDROXYPROGESTERONE ACETATE 10 MG
10 TABLET ORAL DAILY
Qty: 10 TABLET | Refills: 0 | Status: SHIPPED | OUTPATIENT
Start: 2025-05-30 | End: 2025-06-09

## 2025-05-30 NOTE — TELEPHONE ENCOUNTER
I called and s/w pt and advised her to stop her E2 patch and Prometrium and sent in PRovera 10 mg QD x 10 days to help control VB. Keep f/u appt and call for any additional issues.   Kimberly Harrington MD

## 2025-06-02 ENCOUNTER — TELEPHONE (OUTPATIENT)
Dept: OBSTETRICS AND GYNECOLOGY | Facility: CLINIC | Age: 50
End: 2025-06-02

## 2025-06-02 DIAGNOSIS — Z12.31 ENCOUNTER FOR SCREENING MAMMOGRAM FOR MALIGNANT NEOPLASM OF BREAST: Primary | ICD-10-CM

## 2025-06-02 NOTE — TELEPHONE ENCOUNTER
Caller: Kendra Mendoza    Relationship: Self    Best call back number: [phone] (home)     What orders are you requesting (i.e. lab or imaging): MAMMOGRAM    In what timeframe would the patient need to come in: DOESN'T MIND WAITING UNTIL OCTOBER TO HAVE ON SAME DAY AS ANNUAL (10/30/2025) OK TO HAVE IT SOONER IF NEEDED THOUGH.    Where will you receive your lab/imaging services: [facility]    Additional notes: LAST MAMMOGRAM 4/2023

## 2025-06-13 ENCOUNTER — TELEPHONE (OUTPATIENT)
Dept: OBSTETRICS AND GYNECOLOGY | Facility: CLINIC | Age: 50
End: 2025-06-13
Payer: COMMERCIAL

## 2025-06-13 RX ORDER — PROGESTERONE 100 MG/1
200 CAPSULE ORAL NIGHTLY
Qty: 90 CAPSULE | Refills: 1 | Status: SHIPPED | OUTPATIENT
Start: 2025-06-13

## 2025-06-13 RX ORDER — ESTRADIOL 0.04 MG/D
1 PATCH, EXTENDED RELEASE TRANSDERMAL 2 TIMES WEEKLY
Qty: 24 PATCH | Refills: 0 | Status: SHIPPED | OUTPATIENT
Start: 2025-06-16

## 2025-06-13 NOTE — TELEPHONE ENCOUNTER
I called and spoke with patient.  She is no longer bleeding.  I did call in refills of her Vivelle-Dot and Prometrium 200 mg to [facility].  She had stopped taking her Lexapro 5 mg several months ago because she did not think it was helpful, however, she now realizes that probably was beneficial.  We discussed increasing her Lexapro to 10 mg and she is agreeable.  She has plenty of 5 mg at home and will take 2 at a time.  She will call for refills of Lexapro when needed.    Kimberly Harrington MD

## 2025-08-26 RX ORDER — ESCITALOPRAM OXALATE 10 MG/1
10 TABLET ORAL DAILY
Qty: 90 TABLET | Refills: 0 | Status: SHIPPED | OUTPATIENT
Start: 2025-08-26 | End: 2025-08-28 | Stop reason: SDUPTHER

## 2025-08-28 RX ORDER — ESCITALOPRAM OXALATE 10 MG/1
10 TABLET ORAL DAILY
Qty: 90 TABLET | Refills: 0 | Status: SHIPPED | OUTPATIENT
Start: 2025-08-28